# Patient Record
Sex: MALE | Race: WHITE | NOT HISPANIC OR LATINO | Employment: FULL TIME | URBAN - METROPOLITAN AREA
[De-identification: names, ages, dates, MRNs, and addresses within clinical notes are randomized per-mention and may not be internally consistent; named-entity substitution may affect disease eponyms.]

---

## 2023-05-11 ENCOUNTER — OFFICE VISIT (OUTPATIENT)
Dept: PRIMARY CARE | Facility: CLINIC | Age: 33
End: 2023-05-11
Payer: MEDICAID

## 2023-05-11 ENCOUNTER — LAB (OUTPATIENT)
Dept: LAB | Facility: LAB | Age: 33
End: 2023-05-11
Payer: MEDICAID

## 2023-05-11 VITALS
BODY MASS INDEX: 37.22 KG/M2 | HEIGHT: 70 IN | DIASTOLIC BLOOD PRESSURE: 77 MMHG | TEMPERATURE: 98.4 F | WEIGHT: 260 LBS | SYSTOLIC BLOOD PRESSURE: 122 MMHG | RESPIRATION RATE: 16 BRPM | HEART RATE: 78 BPM

## 2023-05-11 DIAGNOSIS — F32.A DEPRESSION, UNSPECIFIED DEPRESSION TYPE: ICD-10-CM

## 2023-05-11 DIAGNOSIS — E66.09 CLASS 2 OBESITY DUE TO EXCESS CALORIES WITHOUT SERIOUS COMORBIDITY WITH BODY MASS INDEX (BMI) OF 37.0 TO 37.9 IN ADULT: Primary | ICD-10-CM

## 2023-05-11 DIAGNOSIS — E66.09 CLASS 2 OBESITY DUE TO EXCESS CALORIES WITHOUT SERIOUS COMORBIDITY WITH BODY MASS INDEX (BMI) OF 37.0 TO 37.9 IN ADULT: ICD-10-CM

## 2023-05-11 DIAGNOSIS — E55.9 VITAMIN D DEFICIENCY: ICD-10-CM

## 2023-05-11 PROBLEM — M54.50 LOW BACK PAIN: Status: ACTIVE | Noted: 2023-05-11

## 2023-05-11 PROBLEM — F95.2 TOURETTE'S: Status: ACTIVE | Noted: 2023-05-11

## 2023-05-11 PROBLEM — F41.9 ANXIETY: Status: ACTIVE | Noted: 2023-05-11

## 2023-05-11 PROBLEM — E66.812 CLASS 2 OBESITY DUE TO EXCESS CALORIES WITHOUT SERIOUS COMORBIDITY WITH BODY MASS INDEX (BMI) OF 37.0 TO 37.9 IN ADULT: Status: ACTIVE | Noted: 2023-05-11

## 2023-05-11 PROBLEM — K21.9 GERD (GASTROESOPHAGEAL REFLUX DISEASE): Status: ACTIVE | Noted: 2023-05-11

## 2023-05-11 PROBLEM — G47.00 INSOMNIA: Status: ACTIVE | Noted: 2023-05-11

## 2023-05-11 LAB
CALCIDIOL (25 OH VITAMIN D3) (NG/ML) IN SER/PLAS: 27 NG/ML
CHOLESTEROL (MG/DL) IN SER/PLAS: 185 MG/DL (ref 0–199)
CHOLESTEROL IN HDL (MG/DL) IN SER/PLAS: 22.9 MG/DL
CHOLESTEROL/HDL RATIO: 8.1
LDL: 120 MG/DL (ref 0–99)
NON HDL CHOLESTEROL: 162 MG/DL
THYROTROPIN (MIU/L) IN SER/PLAS BY DETECTION LIMIT <= 0.05 MIU/L: 2.48 MIU/L (ref 0.44–3.98)
TRIGLYCERIDE (MG/DL) IN SER/PLAS: 211 MG/DL (ref 0–149)
VLDL: 42 MG/DL (ref 0–40)

## 2023-05-11 PROCEDURE — 3008F BODY MASS INDEX DOCD: CPT | Performed by: FAMILY MEDICINE

## 2023-05-11 PROCEDURE — 1036F TOBACCO NON-USER: CPT | Performed by: FAMILY MEDICINE

## 2023-05-11 PROCEDURE — 99214 OFFICE O/P EST MOD 30 MIN: CPT | Performed by: FAMILY MEDICINE

## 2023-05-11 PROCEDURE — 82306 VITAMIN D 25 HYDROXY: CPT

## 2023-05-11 PROCEDURE — 36415 COLL VENOUS BLD VENIPUNCTURE: CPT

## 2023-05-11 PROCEDURE — 84443 ASSAY THYROID STIM HORMONE: CPT

## 2023-05-11 PROCEDURE — 80061 LIPID PANEL: CPT

## 2023-05-11 RX ORDER — CHOLECALCIFEROL (VITAMIN D3) 50 MCG
50 TABLET ORAL DAILY
COMMUNITY
Start: 2022-11-19

## 2023-05-11 RX ORDER — OMEPRAZOLE 20 MG/1
20 CAPSULE, DELAYED RELEASE ORAL DAILY
COMMUNITY
End: 2023-10-30 | Stop reason: SDUPTHER

## 2023-05-11 RX ORDER — ACETAMINOPHEN, DIPHENHYDRAMINE HCL, PHENYLEPHRINE HCL 325; 25; 5 MG/1; MG/1; MG/1
0.5 TABLET ORAL NIGHTLY
COMMUNITY

## 2023-05-11 RX ORDER — ZOLPIDEM TARTRATE 5 MG/1
5 TABLET ORAL NIGHTLY PRN
COMMUNITY
Start: 2022-12-04 | End: 2023-05-11 | Stop reason: ALTCHOICE

## 2023-05-11 RX ORDER — BUPROPION HYDROCHLORIDE 100 MG/1
100 TABLET, EXTENDED RELEASE ORAL DAILY
COMMUNITY
Start: 2023-02-19 | End: 2023-05-11 | Stop reason: ALTCHOICE

## 2023-05-11 RX ORDER — TIRZEPATIDE 2.5 MG/.5ML
2.5 INJECTION, SOLUTION SUBCUTANEOUS
Qty: 2 ML | Refills: 0 | Status: SHIPPED | OUTPATIENT
Start: 2023-05-11 | End: 2023-06-13

## 2023-05-11 RX ORDER — VENLAFAXINE 75 MG/1
75 TABLET ORAL DAILY
COMMUNITY
End: 2023-07-11 | Stop reason: SDUPTHER

## 2023-05-11 RX ORDER — HYDROXYZINE HYDROCHLORIDE 25 MG/1
25 TABLET, FILM COATED ORAL EVERY 6 HOURS PRN
COMMUNITY
End: 2024-02-06 | Stop reason: SDUPTHER

## 2023-05-11 ASSESSMENT — ENCOUNTER SYMPTOMS
WEIGHT GAIN: 1
DEPRESSED MOOD: 0
DEPRESSION: 1
NERVOUS/ANXIOUS: 0
SLEEP QUALITY: GOOD
INSOMNIA: 0

## 2023-05-11 ASSESSMENT — PATIENT HEALTH QUESTIONNAIRE - PHQ9
1. LITTLE INTEREST OR PLEASURE IN DOING THINGS: NOT AT ALL
2. FEELING DOWN, DEPRESSED OR HOPELESS: NOT AT ALL
SUM OF ALL RESPONSES TO PHQ9 QUESTIONS 1 AND 2: 0

## 2023-05-11 NOTE — PROGRESS NOTES
Subjective   Darwin Ferrell is a 33 y.o. male who presents for Depression.  Depression  Visit Type: follow-up  Patient presents with the following symptoms: weight gain.  Patient is not experiencing: depressed mood, insomnia and nervousness/anxiety.    Sleep quality: good  Compliance with medications:  %      Visit Vitals  /77   Pulse 78   Temp 36.9 °C (98.4 °F)   Resp 16      Objective   Physical Exam  Constitutional:       Appearance: Normal appearance.   HENT:      Head: Normocephalic.   Eyes:      Conjunctiva/sclera: Conjunctivae normal.   Cardiovascular:      Rate and Rhythm: Normal rate and regular rhythm.   Pulmonary:      Effort: Pulmonary effort is normal.      Breath sounds: Normal breath sounds.   Musculoskeletal:      Cervical back: Neck supple.   Skin:     General: Skin is warm and dry.   Neurological:      Mental Status: He is alert.         Assessment/Plan    Problem List Items Addressed This Visit       Depression     He describes up well controlled mood with the venlafaxine which we will continue at 75 mg daily         Vitamin D deficiency     He has been taking an over-the-counter vitamin D supplement and would like to have this level checked to see if he needs to continue the supplement or not         Class 2 obesity due to excess calories without serious comorbidity with body mass index (BMI) of 37.0 to 37.9 in adult - Primary     He has struggled with obesity for many years with varying degrees of success with multiple treatments.  BMI is 37 today putting him at significant increased risk for medical problems based on his weight.  He would like to try medical intervention and I am recommending Mounjaro for him.  We will start with the lowest dose and titrate this up monthly with the help of our pharmacy consultation team.  I explained the mechanism of action of this medication controlling his satiety and the importance of sticking to a healthy diet and exercise regimen while taking  the medication         Relevant Medications    tirzepatide (Mounjaro) 2.5 mg/0.5 mL pen injector    Other Relevant Orders    Follow Up In [facility] - Pharmacy    TSH with reflex to Free T4 if abnormal    Lipid Panel    Vitamin D 25-Hydroxy,Total

## 2023-05-11 NOTE — ASSESSMENT & PLAN NOTE
He has been taking an over-the-counter vitamin D supplement and would like to have this level checked to see if he needs to continue the supplement or not

## 2023-05-11 NOTE — ASSESSMENT & PLAN NOTE
He has struggled with obesity for many years with varying degrees of success with multiple treatments.  BMI is 37 today putting him at significant increased risk for medical problems based on his weight.  He would like to try medical intervention and I am recommending Mounjaro for him.  We will start with the lowest dose and titrate this up monthly with the help of our pharmacy consultation team.  I explained the mechanism of action of this medication controlling his satiety and the importance of sticking to a healthy diet and exercise regimen while taking the medication

## 2023-05-19 ENCOUNTER — APPOINTMENT (OUTPATIENT)
Dept: PHARMACY | Facility: HOSPITAL | Age: 33
End: 2023-05-19
Payer: MEDICAID

## 2023-06-13 ENCOUNTER — TELEMEDICINE (OUTPATIENT)
Dept: PHARMACY | Facility: HOSPITAL | Age: 33
End: 2023-06-13
Payer: MEDICAID

## 2023-06-13 DIAGNOSIS — E66.09 CLASS 2 OBESITY DUE TO EXCESS CALORIES WITHOUT SERIOUS COMORBIDITY WITH BODY MASS INDEX (BMI) OF 37.0 TO 37.9 IN ADULT: ICD-10-CM

## 2023-06-13 RX ORDER — DULAGLUTIDE 0.75 MG/.5ML
0.75 INJECTION, SOLUTION SUBCUTANEOUS
Qty: 2 ML | Refills: 0 | Status: SHIPPED | OUTPATIENT
Start: 2023-06-13 | End: 2023-07-11 | Stop reason: ALTCHOICE

## 2023-06-13 NOTE — PROGRESS NOTES
"Subjective   Patient ID: Darwin Ferrell is a 33 y.o. male who presents for Weight loss    Referring Provider: Morris Marin MD     HPI    Review of Systems    Objective     There were no vitals taken for this visit.     Labs  Lab Results   Component Value Date    BILITOT 0.5 10/26/2022    CALCIUM 9.1 11/13/2022    CO2 30 11/13/2022     11/13/2022    CREATININE 0.80 11/13/2022    GLUCOSE 91 11/13/2022    ALKPHOS 51 10/26/2022    K 3.9 11/13/2022    PROT 6.9 10/26/2022     11/13/2022    AST 22 10/26/2022    ALT 47 10/26/2022    BUN 10 11/13/2022    ANIONGAP 10 11/13/2022    ALBUMIN 4.2 10/26/2022    GFRMALE >90 11/13/2022     Lab Results   Component Value Date    TRIG 211 (H) 05/11/2023    CHOL 185 05/11/2023    HDL 22.9 (A) 05/11/2023     No results found for: \"HGBA1C\"    Assessment/Plan       Mounjaro is not covered under plan for weight loss, but trulicity is covered. Will start pt on Trulicity. Discussed new medication and answered all patient questions.    Plan:  Start Trulicity 0.75mg once a week  Follow up in 4 weeks  Sent to [facility] pharmacy    Heena Rey, PharmD     Continue all meds under the continuation of care with the referring provider and clinical pharmacy team.   "

## 2023-06-13 NOTE — PATIENT INSTRUCTIONS
Hello  Please start Trulicity 0.75mg once a week. Follow up in 4 weeks.  Thank you  Heena Rey, PharmD

## 2023-06-14 NOTE — PROGRESS NOTES
I reviewed the progress note and agree with the resident’s findings and plans as written. Case discussed with resident.    Petey Bagley, PharmD

## 2023-07-11 ENCOUNTER — TELEMEDICINE (OUTPATIENT)
Dept: PHARMACY | Facility: HOSPITAL | Age: 33
End: 2023-07-11
Payer: MEDICAID

## 2023-07-11 DIAGNOSIS — F32.A DEPRESSION, UNSPECIFIED DEPRESSION TYPE: ICD-10-CM

## 2023-07-11 DIAGNOSIS — E66.09 CLASS 2 OBESITY DUE TO EXCESS CALORIES WITHOUT SERIOUS COMORBIDITY WITH BODY MASS INDEX (BMI) OF 37.0 TO 37.9 IN ADULT: ICD-10-CM

## 2023-07-11 RX ORDER — VENLAFAXINE 75 MG/1
75 TABLET ORAL DAILY
Qty: 90 TABLET | Refills: 3 | Status: SHIPPED | OUTPATIENT
Start: 2023-07-11 | End: 2024-06-03 | Stop reason: SDUPTHER

## 2023-07-11 RX ORDER — DULAGLUTIDE 1.5 MG/.5ML
1.5 INJECTION, SOLUTION SUBCUTANEOUS
Qty: 2 ML | Refills: 2 | Status: SHIPPED | OUTPATIENT
Start: 2023-07-11 | End: 2023-10-30

## 2023-07-11 NOTE — PROGRESS NOTES
"Pharmacist Clinic: Weight Management    Darwin Ferrell was referred to the Clinical Pharmacy Team for weight management.     Referring Provider: Morris Marin MD  Problem List Items Addressed This Visit          Endocrine/Metabolic    Class 2 obesity due to excess calories without serious comorbidity with body mass index (BMI) of 37.0 to 37.9 in adult       HISTORY OF PRESENT ILLNESS    Diet: no specific diet or dietary precautions taken    Exercise Routine: goes to gym once, active job    Additional Contributory Factors: Caloric intake, combined dyslipidemia    PHARMACY ASSESSMENT    Allergies: Patient has no known allergies.     [id number] - [place], [place] - [address]   [address]   [facility] [address]  Phone: [phone] Fax: [phone]    [facility] - [place], [place] - [address]  [address]  [facility] [address]  Phone: [phone] Fax: [phone]      No issues reported in regards to accessibility, affordability, adherence, adverse effects, or organization    CURRENT PHARMACOTHERAPY  - Trulicity 0.75 mg once weekly     DRUG INTERACTIONS  - No significant drug-drug interactions exist that require adjustment to therapy    LAB  Lab Results   Component Value Date    BILITOT 0.5 10/26/2022    CALCIUM 9.1 11/13/2022    CO2 30 11/13/2022     11/13/2022    CREATININE 0.80 11/13/2022    GLUCOSE 91 11/13/2022    ALKPHOS 51 10/26/2022    K 3.9 11/13/2022    PROT 6.9 10/26/2022     11/13/2022    AST 22 10/26/2022    ALT 47 10/26/2022    BUN 10 11/13/2022    ANIONGAP 10 11/13/2022    ALBUMIN 4.2 10/26/2022     Lab Results   Component Value Date    TRIG 211 (H) 05/11/2023    CHOL 185 05/11/2023    HDL 22.9 (A) 05/11/2023     No results found for: \"HGBA1C\"    Current Outpatient Medications on File Prior to Visit   Medication Sig Dispense Refill    cholecalciferol (Vitamin D-3) 50 MCG (2000 UT) tablet Take 1 tablet (50 mcg) by mouth once daily. "      dulaglutide (Trulicity) 0.75 mg/0.5 mL pen injector Inject 0.75 mg under the skin 1 (one) time per week for 28 days. Wearn please mail. Contact Heena with questions 2 mL 0    hydrOXYzine HCL (Atarax) 25 mg tablet Take 1 tablet (25 mg) by mouth every 6 hours if needed for anxiety.      melatonin 10 mg tablet Take 0.5 tablets by mouth once daily at bedtime.      omeprazole (PriLOSEC) 20 mg DR capsule Take 1 capsule (20 mg) by mouth once daily.      venlafaxine (Effexor) 75 mg tablet Take 1 tablet (75 mg) by mouth once daily.       No current facility-administered medications on file prior to visit.       PATIENT EDUCATION/GOALS  - Discussed avoiding greasy foods while on Trulicity for better tolerance  - Discussed weekly exercise recommendations and the importance of diet and lifestyle changes in combination with pharmacotherapy    - Counseled patient on MOA, expectations, side effects, duration of therapy, contraindications, administration, and monitoring parameters  - Answered all patient questions and concerns; provided [facility] phone number if issues/questions arise    RECOMMENDATIONS/PLAN    -10 lbs of weight loss since starting GLP-1 therapy  -feels really bloated some days but overall tolerable. Educated to avoid greasy foods  -discussed need for higher dose for greater weight loss benefit. Patient agreeable to plan.    PLAN:  1. Weight Loss Plan: Increase Trulicity 1.5 mg subcutaneously once weekly    Clinical Pharmacist follow up: 4 weeks  Type of Encounter: telehealth    Continue all meds under the continuation of care with the referring provider and clinical pharmacy team.    Thank you,  Roverto Shaw, PharmD     Verbal consent to manage patient's drug therapy was obtained from patient. They were informed they may decline to participate or withdraw from participation in pharmacy services at any time.

## 2023-07-17 ENCOUNTER — OFFICE VISIT (OUTPATIENT)
Dept: PRIMARY CARE | Facility: CLINIC | Age: 33
End: 2023-07-17
Payer: MEDICAID

## 2023-07-17 VITALS
RESPIRATION RATE: 16 BRPM | BODY MASS INDEX: 35.65 KG/M2 | TEMPERATURE: 98.4 F | DIASTOLIC BLOOD PRESSURE: 76 MMHG | HEART RATE: 70 BPM | WEIGHT: 249 LBS | HEIGHT: 70 IN | SYSTOLIC BLOOD PRESSURE: 112 MMHG

## 2023-07-17 DIAGNOSIS — E66.09 CLASS 2 OBESITY DUE TO EXCESS CALORIES WITHOUT SERIOUS COMORBIDITY WITH BODY MASS INDEX (BMI) OF 37.0 TO 37.9 IN ADULT: Primary | ICD-10-CM

## 2023-07-17 DIAGNOSIS — F41.9 ANXIETY: ICD-10-CM

## 2023-07-17 DIAGNOSIS — F32.A DEPRESSION, UNSPECIFIED DEPRESSION TYPE: ICD-10-CM

## 2023-07-17 PROCEDURE — 1036F TOBACCO NON-USER: CPT | Performed by: FAMILY MEDICINE

## 2023-07-17 PROCEDURE — 3008F BODY MASS INDEX DOCD: CPT | Performed by: FAMILY MEDICINE

## 2023-07-17 PROCEDURE — 99214 OFFICE O/P EST MOD 30 MIN: CPT | Performed by: FAMILY MEDICINE

## 2023-07-17 RX ORDER — BUSPIRONE HYDROCHLORIDE 10 MG/1
10 TABLET ORAL 2 TIMES DAILY
Qty: 60 TABLET | Refills: 2 | Status: SHIPPED | OUTPATIENT
Start: 2023-07-17 | End: 2023-09-27

## 2023-07-17 ASSESSMENT — ANXIETY QUESTIONNAIRES
5. BEING SO RESTLESS THAT IT IS HARD TO SIT STILL: MORE THAN HALF THE DAYS
2. NOT BEING ABLE TO STOP OR CONTROL WORRYING: NEARLY EVERY DAY
6. BECOMING EASILY ANNOYED OR IRRITABLE: NEARLY EVERY DAY
4. TROUBLE RELAXING: NEARLY EVERY DAY
7. FEELING AFRAID AS IF SOMETHING AWFUL MIGHT HAPPEN: NEARLY EVERY DAY
GAD7 TOTAL SCORE: 17
3. WORRYING TOO MUCH ABOUT DIFFERENT THINGS: NOT AT ALL
1. FEELING NERVOUS, ANXIOUS, OR ON EDGE: NEARLY EVERY DAY
IF YOU CHECKED OFF ANY PROBLEMS ON THIS QUESTIONNAIRE, HOW DIFFICULT HAVE THESE PROBLEMS MADE IT FOR YOU TO DO YOUR WORK, TAKE CARE OF THINGS AT HOME, OR GET ALONG WITH OTHER PEOPLE: VERY DIFFICULT

## 2023-07-17 NOTE — ASSESSMENT & PLAN NOTE
He has lost 11 pounds total since starting the Trulicity.  I encouraged him to continue exercise, diet control, and with the titration of Trulicity.  We will follow-up in 3 months

## 2023-07-17 NOTE — PROGRESS NOTES
Subjective   Darwin Ferrell is a 33 y.o. male who presents for Anxiety.  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  However, he is notes that he is anxious all the time.  He is worrying excessively on a regular basis.  He is having panic attacks where he feels like he cannot breathe on an almost daily basis.    He is doing very well with the Trulicity and has been titrating the dose up.  He had a little bit of nausea and bloating which resolved after a few days.  He describes some odd muscle twitching in his right upper abdomen and asks if this might be due to the Trulicity but he says that he is giving the Trulicity shots on the left side of his abdomen      Visit Vitals  /76   Pulse 70   Temp 36.9 °C (98.4 °F)   Resp 16      Objective   Physical Exam  Constitutional:       Appearance: Normal appearance.   HENT:      Head: Normocephalic.   Pulmonary:      Effort: Pulmonary effort is normal.   Musculoskeletal:      Cervical back: Neck supple.      Right lower leg: No edema.      Left lower leg: No edema.   Skin:     General: Skin is warm and dry.   Psychiatric:         Mood and Affect: Mood normal.         Assessment/Plan    Problem List Items Addressed This Visit       Anxiety     This 33-year-old male is having fairly regular panic attacks of dyspnea without any specific trigger.  He is describing a constant level of anxiety and excessive worry.  The symptoms are improved after a dose of hydroxyzine but always come back and sometimes require multiple doses of hydroxyzine.  I think that it is time to start a more long-term prophylactic antianxiety medication.  I would like to initiate buspirone 10 mg twice daily.  We discussed the fact that this is not habit-forming nor sedating but should significantly decrease the anxiety symptoms and panic attacks over time.  We will continue the venlafaxine and he may use hydroxyzine intermittently on top of  this treatment         Relevant Medications    busPIRone (Buspar) 10 mg tablet    Depression    Class 2 obesity due to excess calories without serious comorbidity with body mass index (BMI) of 37.0 to 37.9 in adult - Primary     He has lost 11 pounds total since starting the Trulicity.  I encouraged him to continue exercise, diet control, and with the titration of Trulicity.  We will follow-up in 3 months         Relevant Orders    Follow Up In [facility] - PCP - Established

## 2023-07-17 NOTE — ASSESSMENT & PLAN NOTE
This 33-year-old male is having fairly regular panic attacks of dyspnea without any specific trigger.  He is describing a constant level of anxiety and excessive worry.  The symptoms are improved after a dose of hydroxyzine but always come back and sometimes require multiple doses of hydroxyzine.  I think that it is time to start a more long-term prophylactic antianxiety medication.  I would like to initiate buspirone 10 mg twice daily.  We discussed the fact that this is not habit-forming nor sedating but should significantly decrease the anxiety symptoms and panic attacks over time.  We will continue the venlafaxine and he may use hydroxyzine intermittently on top of this treatment

## 2023-07-28 ENCOUNTER — TELEPHONE (OUTPATIENT)
Dept: PRIMARY CARE | Facility: CLINIC | Age: 33
End: 2023-07-28
Payer: MEDICAID

## 2023-08-08 ENCOUNTER — TELEMEDICINE (OUTPATIENT)
Dept: PHARMACY | Facility: HOSPITAL | Age: 33
End: 2023-08-08
Payer: MEDICAID

## 2023-08-08 DIAGNOSIS — E66.09 CLASS 2 OBESITY DUE TO EXCESS CALORIES WITHOUT SERIOUS COMORBIDITY WITH BODY MASS INDEX (BMI) OF 37.0 TO 37.9 IN ADULT: ICD-10-CM

## 2023-08-08 NOTE — PROGRESS NOTES
"Subjective   Patient ID: Darwin Ferrell is a 33 y.o. male who presents for Obesity (Follow-up on Trulicity use). Stopped Trulicity about a week ago per pcp and patient due to G.I. side effects. Sx have improved since stopping and would like to wait until next PCP visit to visit other options.     There were no vitals taken for this visit.        Assessment/Plan   Problem List Items Addressed This Visit       Class 2 obesity due to excess calories without serious comorbidity with body mass index (BMI) of 37.0 to 37.9 in adult       LAB RESULTS:  No results found for: \"HGBA1C\"  Lab Results   Component Value Date    CREATININE 0.80 11/13/2022      Lab Results   Component Value Date    CHOL 185 05/11/2023     Lab Results   Component Value Date    HDL 22.9 (A) 05/11/2023       No results found for: \"LDLCALC\"  Lab Results   Component Value Date    TRIG 211 (H) 05/11/2023       Continue all meds under the continuation of care with the referring provider and clinical pharmacy team.  -Follow-up as needed; therapy options to be discussed at next PCP appt.        "

## 2023-09-08 ENCOUNTER — APPOINTMENT (OUTPATIENT)
Dept: PRIMARY CARE | Facility: CLINIC | Age: 33
End: 2023-09-08
Payer: MEDICAID

## 2023-09-23 DIAGNOSIS — E55.9 VITAMIN D DEFICIENCY: Primary | ICD-10-CM

## 2023-09-25 RX ORDER — NICOTINE 11MG/24HR
50 PATCH, TRANSDERMAL 24 HOURS TRANSDERMAL DAILY
Qty: 90 CAPSULE | Refills: 0 | Status: SHIPPED | OUTPATIENT
Start: 2023-09-25 | End: 2023-10-30 | Stop reason: ALTCHOICE

## 2023-09-27 DIAGNOSIS — F41.9 ANXIETY: ICD-10-CM

## 2023-09-27 RX ORDER — BUSPIRONE HYDROCHLORIDE 10 MG/1
10 TABLET ORAL 2 TIMES DAILY
Qty: 60 TABLET | Refills: 3 | Status: SHIPPED | OUTPATIENT
Start: 2023-09-27 | End: 2023-10-30 | Stop reason: SDUPTHER

## 2023-10-18 ENCOUNTER — APPOINTMENT (OUTPATIENT)
Dept: PRIMARY CARE | Facility: CLINIC | Age: 33
End: 2023-10-18
Payer: COMMERCIAL

## 2023-10-30 ENCOUNTER — OFFICE VISIT (OUTPATIENT)
Dept: PRIMARY CARE | Facility: CLINIC | Age: 33
End: 2023-10-30
Payer: COMMERCIAL

## 2023-10-30 VITALS
RESPIRATION RATE: 16 BRPM | SYSTOLIC BLOOD PRESSURE: 115 MMHG | TEMPERATURE: 98.3 F | BODY MASS INDEX: 35.5 KG/M2 | WEIGHT: 248 LBS | DIASTOLIC BLOOD PRESSURE: 78 MMHG | HEIGHT: 70 IN | HEART RATE: 76 BPM

## 2023-10-30 DIAGNOSIS — F41.9 ANXIETY: ICD-10-CM

## 2023-10-30 DIAGNOSIS — E66.09 CLASS 2 OBESITY DUE TO EXCESS CALORIES WITHOUT SERIOUS COMORBIDITY WITH BODY MASS INDEX (BMI) OF 37.0 TO 37.9 IN ADULT: ICD-10-CM

## 2023-10-30 DIAGNOSIS — K21.9 GASTROESOPHAGEAL REFLUX DISEASE, UNSPECIFIED WHETHER ESOPHAGITIS PRESENT: ICD-10-CM

## 2023-10-30 PROCEDURE — 1036F TOBACCO NON-USER: CPT | Performed by: FAMILY MEDICINE

## 2023-10-30 PROCEDURE — 99213 OFFICE O/P EST LOW 20 MIN: CPT | Performed by: FAMILY MEDICINE

## 2023-10-30 PROCEDURE — 3008F BODY MASS INDEX DOCD: CPT | Performed by: FAMILY MEDICINE

## 2023-10-30 RX ORDER — OMEPRAZOLE 20 MG/1
20 CAPSULE, DELAYED RELEASE ORAL DAILY
Qty: 90 CAPSULE | Refills: 3 | Status: SHIPPED | OUTPATIENT
Start: 2023-10-30

## 2023-10-30 RX ORDER — BUSPIRONE HYDROCHLORIDE 15 MG/1
15 TABLET ORAL 2 TIMES DAILY
Qty: 60 TABLET | Refills: 11 | Status: SHIPPED | OUTPATIENT
Start: 2023-10-30 | End: 2024-02-19 | Stop reason: SDUPTHER

## 2023-10-30 ASSESSMENT — ENCOUNTER SYMPTOMS: NERVOUS/ANXIOUS: 1

## 2023-10-30 NOTE — ASSESSMENT & PLAN NOTE
We started BuSpar 10 mg at the last visit there was a noticeable decrease in anxiety symptoms but they have been slowly creeping back over the last month.  He is still having significant panic attacks intermittently.  These are reasonably controlled with hydroxyzine but I would say that this is definitely not under control so we will increase the dose to 50 mg twice daily.  If this is not sufficient we can increase the dose 1 more time before his next visit.

## 2023-10-30 NOTE — PROGRESS NOTES
Subjective   Darwin Ferrell is a 33 y.o. male who presents for Anxiety.  Anxiety  Presents for follow-up visit. Symptoms include excessive worry and nervous/anxious behavior. Symptoms occur most days. The quality of sleep is good.     Compliance with medications is %.     Visit Vitals  /78   Pulse 76   Temp 36.8 °C (98.3 °F)   Resp 16      Objective   Physical Exam  Constitutional:       Appearance: Normal appearance.   HENT:      Head: Normocephalic.      Right Ear: Tympanic membrane, ear canal and external ear normal.      Left Ear: Tympanic membrane, ear canal and external ear normal.      Nose: Nose normal.      Mouth/Throat:      Mouth: Mucous membranes are moist.   Eyes:      Conjunctiva/sclera: Conjunctivae normal.   Cardiovascular:      Rate and Rhythm: Normal rate and regular rhythm.   Pulmonary:      Effort: Pulmonary effort is normal.      Breath sounds: Normal breath sounds.   Skin:     General: Skin is warm.      Findings: No rash.   Neurological:      Mental Status: He is alert.   Psychiatric:         Mood and Affect: Mood normal.         Assessment/Plan    Problem List Items Addressed This Visit       Anxiety     We started BuSpar 10 mg at the last visit there was a noticeable decrease in anxiety symptoms but they have been slowly creeping back over the last month.  He is still having significant panic attacks intermittently.  These are reasonably controlled with hydroxyzine but I would say that this is definitely not under control so we will increase the dose to 50 mg twice daily.  If this is not sufficient we can increase the dose 1 more time before his next visit.         Relevant Medications    busPIRone (Buspar) 15 mg tablet    Other Relevant Orders    Follow Up In [facility] - PCP - Established    GERD (gastroesophageal reflux disease)     This is well controlled we will refill the omeprazole         Relevant Medications    omeprazole (PriLOSEC) 20 mg DR capsule    Class 2  obesity due to excess calories without serious comorbidity with body mass index (BMI) of 37.0 to 37.9 in adult

## 2024-01-22 ENCOUNTER — OFFICE VISIT (OUTPATIENT)
Dept: PRIMARY CARE | Facility: CLINIC | Age: 34
End: 2024-01-22
Payer: COMMERCIAL

## 2024-01-22 ENCOUNTER — HOSPITAL ENCOUNTER (OUTPATIENT)
Dept: RADIOLOGY | Facility: CLINIC | Age: 34
Discharge: HOME | End: 2024-01-22
Payer: COMMERCIAL

## 2024-01-22 VITALS
HEART RATE: 84 BPM | SYSTOLIC BLOOD PRESSURE: 127 MMHG | BODY MASS INDEX: 36.08 KG/M2 | DIASTOLIC BLOOD PRESSURE: 85 MMHG | HEIGHT: 70 IN | TEMPERATURE: 97.5 F | WEIGHT: 252 LBS | RESPIRATION RATE: 16 BRPM

## 2024-01-22 DIAGNOSIS — K21.9 GASTROESOPHAGEAL REFLUX DISEASE WITHOUT ESOPHAGITIS: ICD-10-CM

## 2024-01-22 DIAGNOSIS — N50.811 TESTICULAR PAIN, RIGHT: ICD-10-CM

## 2024-01-22 DIAGNOSIS — N50.811 TESTICULAR PAIN, RIGHT: Primary | ICD-10-CM

## 2024-01-22 DIAGNOSIS — F41.9 ANXIETY: ICD-10-CM

## 2024-01-22 PROCEDURE — 99213 OFFICE O/P EST LOW 20 MIN: CPT | Performed by: FAMILY MEDICINE

## 2024-01-22 PROCEDURE — 76870 US EXAM SCROTUM: CPT | Performed by: RADIOLOGY

## 2024-01-22 PROCEDURE — 76870 US EXAM SCROTUM: CPT

## 2024-01-22 PROCEDURE — 3008F BODY MASS INDEX DOCD: CPT | Performed by: FAMILY MEDICINE

## 2024-01-22 PROCEDURE — 1036F TOBACCO NON-USER: CPT | Performed by: FAMILY MEDICINE

## 2024-01-22 ASSESSMENT — ENCOUNTER SYMPTOMS
NERVOUS/ANXIOUS: 1
PANIC: 1

## 2024-01-22 NOTE — PROGRESS NOTES
Subjective   Darwin Ferrell is a 33 y.o. male who presents for Testicle Pain, Anxiety, and Oral Pain.  Testicle Pain  The patient's primary symptoms include testicular pain. Episode onset: about 2 weeks ago. The problem has been gradually improving. The testicular pain affects the right testicle.   Anxiety  Presents for follow-up visit. Symptoms include nervous/anxious behavior and panic. Symptoms occur most days. The quality of sleep is good.     Compliance with medications is %.   Oral Pain   This is a new problem. Episode onset: about 2 weeks ago. The problem occurs constantly. The problem has been unchanged.     Visit Vitals  /85   Pulse 84   Temp 36.4 °C (97.5 °F)   Resp 16      Objective   Physical Exam  Constitutional:       Appearance: Normal appearance.   HENT:      Head: Normocephalic.      Mouth/Throat:      Mouth: Mucous membranes are moist.      Comments: There is some mildly enlarged papilla on the tongue but no ulcers, thrush, erythema, or edema.  The soft palate is clear and pink  Eyes:      Conjunctiva/sclera: Conjunctivae normal.   Cardiovascular:      Rate and Rhythm: Normal rate and regular rhythm.   Pulmonary:      Effort: Pulmonary effort is normal.      Breath sounds: Normal breath sounds.   Genitourinary:     Comments: The inguinal canal shows no enlargement or bulge on Valsalva and no adenopathy.  The phallus is normal.  Testicles are equal in size but the right testicle is significantly tender and difficult to fully examine.  I cannot appreciate any epididymis enlargement or testicular mass today.  Musculoskeletal:      Cervical back: Neck supple.   Skin:     General: Skin is warm and dry.   Neurological:      Mental Status: He is alert.         Assessment/Plan    Problem List Items Addressed This Visit       Anxiety     This is significantly better with the increased dose of BuSpar 15 mg twice daily and the current dose of Effexor.  He had one anxiety attack that seems to be  triggered by testicular pain but otherwise is not having increased anxiety on a daily basis and is having less than 1 panic attack per month.  We will continue the current dose         Relevant Orders    Follow Up In [facility] - PCP - Established    GERD (gastroesophageal reflux disease)     He is currently describing some burning on his tongue.  Exam reveals a normal-looking tongue with some mildly enlarged papilla but no sign of thrush or infection or significant glossitis.  This may be due to acid irritation on the tongue versus a vitamin deficiency.  I recommended a multivitamin and if that is not helpful doubling up on the omeprazole for 2 weeks to reduce acid on the tongue.          Other Visit Diagnoses       Testicular pain, right    -  Primary    Relevant Orders    US scrotum        The right testicular pain is significant but the exam is relatively benign except for pain.  There is no inguinal adenopathy or evidence of an inguinal hernia.  I think the differential diagnosis includes epididymitis or varicocele or potentially intermittent testicular torsion.  We will get an ultrasound to help differentiate and possibly refer to urology

## 2024-01-22 NOTE — ASSESSMENT & PLAN NOTE
This is significantly better with the increased dose of BuSpar 15 mg twice daily and the current dose of Effexor.  He had one anxiety attack that seems to be triggered by testicular pain but otherwise is not having increased anxiety on a daily basis and is having less than 1 panic attack per month.  We will continue the current dose

## 2024-01-22 NOTE — ASSESSMENT & PLAN NOTE
He is currently describing some burning on his tongue.  Exam reveals a normal-looking tongue with some mildly enlarged papilla but no sign of thrush or infection or significant glossitis.  This may be due to acid irritation on the tongue versus a vitamin deficiency.  I recommended a multivitamin and if that is not helpful doubling up on the omeprazole for 2 weeks to reduce acid on the tongue.

## 2024-02-06 DIAGNOSIS — F41.9 ANXIETY: ICD-10-CM

## 2024-02-06 RX ORDER — HYDROXYZINE HYDROCHLORIDE 25 MG/1
25 TABLET, FILM COATED ORAL EVERY 6 HOURS PRN
Qty: 90 TABLET | Refills: 0 | Status: SHIPPED | OUTPATIENT
Start: 2024-02-06 | End: 2024-04-01 | Stop reason: SDUPTHER

## 2024-02-28 ENCOUNTER — OFFICE VISIT (OUTPATIENT)
Dept: PRIMARY CARE | Facility: CLINIC | Age: 34
End: 2024-02-28
Payer: COMMERCIAL

## 2024-02-28 VITALS
RESPIRATION RATE: 18 BRPM | BODY MASS INDEX: 36.94 KG/M2 | DIASTOLIC BLOOD PRESSURE: 68 MMHG | WEIGHT: 258 LBS | HEART RATE: 90 BPM | HEIGHT: 70 IN | TEMPERATURE: 97.9 F | SYSTOLIC BLOOD PRESSURE: 124 MMHG

## 2024-02-28 DIAGNOSIS — K21.9 GASTROESOPHAGEAL REFLUX DISEASE WITHOUT ESOPHAGITIS: ICD-10-CM

## 2024-02-28 DIAGNOSIS — F41.9 ANXIETY: Primary | ICD-10-CM

## 2024-02-28 PROCEDURE — 99213 OFFICE O/P EST LOW 20 MIN: CPT | Performed by: FAMILY MEDICINE

## 2024-02-28 PROCEDURE — 1036F TOBACCO NON-USER: CPT | Performed by: FAMILY MEDICINE

## 2024-02-28 PROCEDURE — 3008F BODY MASS INDEX DOCD: CPT | Performed by: FAMILY MEDICINE

## 2024-02-28 RX ORDER — BUSPIRONE HYDROCHLORIDE 30 MG/1
30 TABLET ORAL 2 TIMES DAILY
Qty: 60 TABLET | Refills: 3 | Status: SHIPPED | OUTPATIENT
Start: 2024-02-28

## 2024-02-28 ASSESSMENT — ANXIETY QUESTIONNAIRES
4. TROUBLE RELAXING: MORE THAN HALF THE DAYS
5. BEING SO RESTLESS THAT IT IS HARD TO SIT STILL: SEVERAL DAYS
GAD7 TOTAL SCORE: 17
2. NOT BEING ABLE TO STOP OR CONTROL WORRYING: MORE THAN HALF THE DAYS
3. WORRYING TOO MUCH ABOUT DIFFERENT THINGS: NEARLY EVERY DAY
5. BEING SO RESTLESS THAT IT IS HARD TO SIT STILL: SEVERAL DAYS
4. TROUBLE RELAXING: SEVERAL DAYS
3. WORRYING TOO MUCH ABOUT DIFFERENT THINGS: MORE THAN HALF THE DAYS
GAD7 TOTAL SCORE: 12
6. BECOMING EASILY ANNOYED OR IRRITABLE: SEVERAL DAYS
7. FEELING AFRAID AS IF SOMETHING AWFUL MIGHT HAPPEN: NEARLY EVERY DAY
1. FEELING NERVOUS, ANXIOUS, OR ON EDGE: NEARLY EVERY DAY
1. FEELING NERVOUS, ANXIOUS, OR ON EDGE: NEARLY EVERY DAY
6. BECOMING EASILY ANNOYED OR IRRITABLE: NEARLY EVERY DAY
7. FEELING AFRAID AS IF SOMETHING AWFUL MIGHT HAPPEN: MORE THAN HALF THE DAYS
2. NOT BEING ABLE TO STOP OR CONTROL WORRYING: MORE THAN HALF THE DAYS
IF YOU CHECKED OFF ANY PROBLEMS ON THIS QUESTIONNAIRE, HOW DIFFICULT HAVE THESE PROBLEMS MADE IT FOR YOU TO DO YOUR WORK, TAKE CARE OF THINGS AT HOME, OR GET ALONG WITH OTHER PEOPLE: SOMEWHAT DIFFICULT

## 2024-02-28 ASSESSMENT — ENCOUNTER SYMPTOMS
PALPITATIONS: 1
PANIC: 1
NERVOUS/ANXIOUS: 1

## 2024-02-28 NOTE — PROGRESS NOTES
Subjective   Darwin Ferrell is a 33 y.o. male who presents for Anxiety.  Anxiety  Presents for follow-up visit. Symptoms include excessive worry, nervous/anxious behavior, palpitations and panic. The severity of symptoms is causing significant distress. The quality of sleep is good.     Compliance with medications is %.            Visit Vitals  /68   Pulse 90   Temp 36.6 °C (97.9 °F)   Resp 18      Objective   Physical Exam  Constitutional:       Appearance: Normal appearance.   HENT:      Head: Normocephalic.   Pulmonary:      Effort: Pulmonary effort is normal.   Musculoskeletal:      Cervical back: Neck supple.   Skin:     General: Skin is warm and dry.   Psychiatric:         Mood and Affect: Mood normal.     PANDA-7 Total Score: 17    Assessment/Plan      Problem List Items Addressed This Visit       Anxiety - Primary     We did slowly titrating up the BuSpar and some symptoms are better but PANDA-7 score is still at 17 and he had a 6 single panic attack causing him to be working last month.  I believe we should continue the up titration to 30 mg twice daily.  We could also consider a slight increase in the Effexor as there may be some benefit to anxiety from that as well.  He is also interested in starting behavioral health counseling and learning self relaxation techniques which I think would be highly effective in controlling his anxiety.  I will put this referral through         Relevant Medications    busPIRone (Buspar) 30 mg tablet    Other Relevant Orders    Referral to Psychology    Follow Up In [facility] - PCP - Established    GERD (gastroesophageal reflux disease)     The irritation of the tongue has indeed resolved with a higher dose of omeprazole so we will resume the regular 20 mg dose daily

## 2024-02-28 NOTE — ASSESSMENT & PLAN NOTE
The irritation of the tongue has indeed resolved with a higher dose of omeprazole so we will resume the regular 20 mg dose daily

## 2024-02-28 NOTE — ASSESSMENT & PLAN NOTE
We did slowly titrating up the BuSpar and some symptoms are better but PANDA-7 score is still at 17 and he had a 6 single panic attack causing him to be working last month.  I believe we should continue the up titration to 30 mg twice daily.  We could also consider a slight increase in the Effexor as there may be some benefit to anxiety from that as well.  He is also interested in starting behavioral health counseling and learning self relaxation techniques which I think would be highly effective in controlling his anxiety.  I will put this referral through

## 2024-04-01 DIAGNOSIS — F41.9 ANXIETY: ICD-10-CM

## 2024-04-01 RX ORDER — HYDROXYZINE HYDROCHLORIDE 25 MG/1
25 TABLET, FILM COATED ORAL EVERY 6 HOURS PRN
Qty: 90 TABLET | Refills: 0 | Status: SHIPPED | OUTPATIENT
Start: 2024-04-01 | End: 2024-06-04

## 2024-06-03 DIAGNOSIS — F32.A DEPRESSION, UNSPECIFIED DEPRESSION TYPE: ICD-10-CM

## 2024-06-03 DIAGNOSIS — F41.9 ANXIETY: ICD-10-CM

## 2024-06-03 RX ORDER — VENLAFAXINE 75 MG/1
75 TABLET ORAL DAILY
Qty: 90 TABLET | Refills: 3 | Status: SHIPPED | OUTPATIENT
Start: 2024-06-03

## 2024-06-04 RX ORDER — HYDROXYZINE PAMOATE 25 MG/1
25 CAPSULE ORAL EVERY 6 HOURS PRN
Qty: 90 CAPSULE | Refills: 0 | Status: SHIPPED | OUTPATIENT
Start: 2024-06-04

## 2024-06-05 ENCOUNTER — OFFICE VISIT (OUTPATIENT)
Dept: PRIMARY CARE | Facility: CLINIC | Age: 34
End: 2024-06-05
Payer: COMMERCIAL

## 2024-06-05 VITALS
DIASTOLIC BLOOD PRESSURE: 85 MMHG | HEIGHT: 70 IN | SYSTOLIC BLOOD PRESSURE: 122 MMHG | RESPIRATION RATE: 14 BRPM | WEIGHT: 259 LBS | TEMPERATURE: 97.7 F | HEART RATE: 96 BPM | BODY MASS INDEX: 37.08 KG/M2

## 2024-06-05 DIAGNOSIS — F41.9 ANXIETY: ICD-10-CM

## 2024-06-05 DIAGNOSIS — L30.9 DERMATITIS: Primary | ICD-10-CM

## 2024-06-05 PROCEDURE — 3008F BODY MASS INDEX DOCD: CPT | Performed by: FAMILY MEDICINE

## 2024-06-05 PROCEDURE — 99213 OFFICE O/P EST LOW 20 MIN: CPT | Performed by: FAMILY MEDICINE

## 2024-06-05 PROCEDURE — 1036F TOBACCO NON-USER: CPT | Performed by: FAMILY MEDICINE

## 2024-06-05 RX ORDER — KETOCONAZOLE 20 MG/ML
SHAMPOO, SUSPENSION TOPICAL 2 TIMES WEEKLY
Qty: 120 ML | Refills: 0 | Status: SHIPPED | OUTPATIENT
Start: 2024-06-06

## 2024-06-05 ASSESSMENT — ANXIETY QUESTIONNAIRES
1. FEELING NERVOUS, ANXIOUS, OR ON EDGE: SEVERAL DAYS
IF YOU CHECKED OFF ANY PROBLEMS ON THIS QUESTIONNAIRE, HOW DIFFICULT HAVE THESE PROBLEMS MADE IT FOR YOU TO DO YOUR WORK, TAKE CARE OF THINGS AT HOME, OR GET ALONG WITH OTHER PEOPLE: NOT DIFFICULT AT ALL
3. WORRYING TOO MUCH ABOUT DIFFERENT THINGS: NOT AT ALL
6. BECOMING EASILY ANNOYED OR IRRITABLE: NOT AT ALL
GAD7 TOTAL SCORE: 1
7. FEELING AFRAID AS IF SOMETHING AWFUL MIGHT HAPPEN: NOT AT ALL
2. NOT BEING ABLE TO STOP OR CONTROL WORRYING: NOT AT ALL
5. BEING SO RESTLESS THAT IT IS HARD TO SIT STILL: NOT AT ALL
4. TROUBLE RELAXING: NOT AT ALL

## 2024-06-05 ASSESSMENT — ENCOUNTER SYMPTOMS: NERVOUS/ANXIOUS: 1

## 2024-06-05 NOTE — PROGRESS NOTES
Subjective   Darwin Ferrell is a 34 y.o. male who presents for Anxiety.  Anxiety  Presents for follow-up visit. Symptoms include excessive worry and nervous/anxious behavior. The severity of symptoms is mild.     Compliance with medications is %.            Visit Vitals  /85   Pulse 96   Temp 36.5 °C (97.7 °F)   Resp 14    PANDA-7 Total Score: 1    Objective   Physical Exam  Constitutional:       Appearance: Normal appearance.   HENT:      Head: Normocephalic.   Eyes:      Conjunctiva/sclera: Conjunctivae normal.   Cardiovascular:      Rate and Rhythm: Normal rate and regular rhythm.      Heart sounds: Normal heart sounds.   Pulmonary:      Effort: Pulmonary effort is normal.      Breath sounds: Normal breath sounds.   Musculoskeletal:      Cervical back: Neck supple.   Skin:     General: Skin is warm and dry.   Neurological:      Mental Status: He is alert.       Assessment/Plan      Problem List Items Addressed This Visit       Anxiety     Things have improved dramatically since uptitrating BuSpar and continuing the Effexor 75 mg.  He now remembers feelings of anxiety only rarely and occasionally and his PANDA-7 score is only 1.  His last panic attack was before the titration started.  I recommend we continue the BuSpar 30 mg, Effexor 75 mg, and as he has not yet seen the counselor we will reprint the referral to psychology counseling for him to set this up.         Relevant Orders    Follow Up In [facility] - PCP - Established     Other Visit Diagnoses       Dermatitis    -  Primary    Relevant Medications    ketoconazole (NIZOral) 2 % shampoo (Start on 6/6/2024)               Please excuse any errors in grammar or translation related to this dictation. Voice recognition software was utilized to prepare this document.

## 2024-06-05 NOTE — ASSESSMENT & PLAN NOTE
Things have improved dramatically since uptitrating BuSpar and continuing the Effexor 75 mg.  He now remembers feelings of anxiety only rarely and occasionally and his PANDA-7 score is only 1.  His last panic attack was before the titration started.  I recommend we continue the BuSpar 30 mg, Effexor 75 mg, and as he has not yet seen the counselor we will reprint the referral to psychology counseling for him to set this up.

## 2024-06-19 ENCOUNTER — OFFICE VISIT (OUTPATIENT)
Dept: PRIMARY CARE | Facility: CLINIC | Age: 34
End: 2024-06-19
Payer: COMMERCIAL

## 2024-06-19 VITALS
DIASTOLIC BLOOD PRESSURE: 84 MMHG | WEIGHT: 267 LBS | BODY MASS INDEX: 38.31 KG/M2 | OXYGEN SATURATION: 98 % | SYSTOLIC BLOOD PRESSURE: 132 MMHG | HEART RATE: 94 BPM | TEMPERATURE: 98.5 F

## 2024-06-19 DIAGNOSIS — L25.9 CONTACT DERMATITIS, UNSPECIFIED CONTACT DERMATITIS TYPE, UNSPECIFIED TRIGGER: Primary | ICD-10-CM

## 2024-06-19 PROCEDURE — 99213 OFFICE O/P EST LOW 20 MIN: CPT | Performed by: NURSE PRACTITIONER

## 2024-06-19 PROCEDURE — 3008F BODY MASS INDEX DOCD: CPT | Performed by: NURSE PRACTITIONER

## 2024-06-19 PROCEDURE — 1036F TOBACCO NON-USER: CPT | Performed by: NURSE PRACTITIONER

## 2024-06-19 RX ORDER — METHYLPREDNISOLONE 4 MG/1
TABLET ORAL
Qty: 21 TABLET | Refills: 0 | Status: SHIPPED | OUTPATIENT
Start: 2024-06-19 | End: 2024-06-26

## 2024-06-19 ASSESSMENT — ENCOUNTER SYMPTOMS
GASTROINTESTINAL NEGATIVE: 1
CARDIOVASCULAR NEGATIVE: 1
RESPIRATORY NEGATIVE: 1
CHILLS: 0
APPETITE CHANGE: 0
FEVER: 0
FATIGUE: 0
MUSCULOSKELETAL NEGATIVE: 1

## 2024-06-19 NOTE — PROGRESS NOTES
Patient has had hive like patches for the past two weeks. No new soaps, lotions or detergents. Patient has been using hydrocortisone and taking claritin. The patches of skin are itchy. The claritin is helping slightly. Patient is feeling well otherwise. No URI symptoms.     Review of Systems   Constitutional:  Negative for appetite change, chills, fatigue and fever.   HENT: Negative.     Respiratory: Negative.     Cardiovascular: Negative.    Gastrointestinal: Negative.    Musculoskeletal: Negative.    Skin:  Positive for rash.       Objective   /84   Pulse 94   Temp 36.9 °C (98.5 °F)   Wt 121 kg (267 lb)   SpO2 98%   BMI 38.31 kg/m²     Physical Exam  Vitals reviewed.   Constitutional:       General: He is not in acute distress.     Appearance: Normal appearance. He is not ill-appearing.   HENT:      Head: Atraumatic.   Eyes:      Conjunctiva/sclera: Conjunctivae normal.   Cardiovascular:      Rate and Rhythm: Normal rate and regular rhythm.      Heart sounds: Normal heart sounds. No murmur heard.  Pulmonary:      Effort: Pulmonary effort is normal.      Breath sounds: Normal breath sounds. No wheezing or rhonchi.   Skin:     General: Skin is warm.      Comments: Patient with red patches of dry skin on the abdomen, the legs and lower back consistent with contact dermatitis. No s/s of infection    Neurological:      General: No focal deficit present.      Mental Status: He is alert.     Assessment/Plan   Problem List Items Addressed This Visit    None  Visit Diagnoses         Codes    Contact dermatitis, unspecified contact dermatitis type, unspecified trigger    -  Primary L25.9    Relevant Medications    methylPREDNISolone (Medrol Dospak) 4 mg tablets        Patient started on a medrol princess at this time. Reminded pt to avoid other anti-inflammatories while on the steroids; he agreed. Pt to continue claritin. Reminded pt to take cool showers and to keep his skin clean and dry. Advised ER for any worsening  rash or new/concerning symptoms; he agreed. Pt to follow up if symptoms persist despite the use of the medications.

## 2024-06-26 DIAGNOSIS — F41.9 ANXIETY: ICD-10-CM

## 2024-06-26 RX ORDER — HYDROXYZINE PAMOATE 25 MG/1
25 CAPSULE ORAL EVERY 6 HOURS PRN
Qty: 90 CAPSULE | Refills: 0 | Status: SHIPPED | OUTPATIENT
Start: 2024-06-26

## 2024-07-02 DIAGNOSIS — F41.9 ANXIETY: ICD-10-CM

## 2024-07-02 RX ORDER — BUSPIRONE HYDROCHLORIDE 30 MG/1
30 TABLET ORAL 2 TIMES DAILY
Qty: 180 TABLET | Refills: 3 | Status: SHIPPED | OUTPATIENT
Start: 2024-07-02

## 2024-07-19 ENCOUNTER — APPOINTMENT (OUTPATIENT)
Dept: PRIMARY CARE | Facility: CLINIC | Age: 34
End: 2024-07-19
Payer: COMMERCIAL

## 2024-07-23 ENCOUNTER — HOSPITAL ENCOUNTER (OUTPATIENT)
Dept: RADIOLOGY | Facility: EXTERNAL LOCATION | Age: 34
Discharge: HOME | End: 2024-07-23

## 2024-07-23 DIAGNOSIS — M79.672 PAIN IN LEFT FOOT: ICD-10-CM

## 2024-07-23 DIAGNOSIS — M79.671 PAIN IN RIGHT FOOT: ICD-10-CM

## 2024-07-23 DIAGNOSIS — M79.671 RIGHT FOOT PAIN: ICD-10-CM

## 2024-07-23 DIAGNOSIS — M79.672 LEFT FOOT PAIN: ICD-10-CM

## 2024-09-30 DIAGNOSIS — F41.9 ANXIETY: ICD-10-CM

## 2024-09-30 DIAGNOSIS — K21.9 GASTROESOPHAGEAL REFLUX DISEASE, UNSPECIFIED WHETHER ESOPHAGITIS PRESENT: ICD-10-CM

## 2024-09-30 RX ORDER — HYDROXYZINE PAMOATE 25 MG/1
25 CAPSULE ORAL EVERY 6 HOURS PRN
Qty: 90 CAPSULE | Refills: 0 | Status: SHIPPED | OUTPATIENT
Start: 2024-09-30

## 2024-09-30 RX ORDER — OMEPRAZOLE 20 MG/1
20 CAPSULE, DELAYED RELEASE ORAL DAILY
Qty: 90 CAPSULE | Refills: 3 | Status: SHIPPED | OUTPATIENT
Start: 2024-09-30

## 2024-10-19 DIAGNOSIS — F41.9 ANXIETY: ICD-10-CM

## 2024-10-21 RX ORDER — HYDROXYZINE PAMOATE 25 MG/1
25 CAPSULE ORAL EVERY 6 HOURS PRN
Qty: 90 CAPSULE | Refills: 0 | Status: SHIPPED | OUTPATIENT
Start: 2024-10-21

## 2024-12-11 ENCOUNTER — APPOINTMENT (OUTPATIENT)
Dept: PRIMARY CARE | Facility: CLINIC | Age: 34
End: 2024-12-11
Payer: COMMERCIAL

## 2024-12-27 ENCOUNTER — APPOINTMENT (OUTPATIENT)
Dept: PRIMARY CARE | Facility: CLINIC | Age: 34
End: 2024-12-27
Payer: COMMERCIAL

## 2024-12-27 VITALS
WEIGHT: 270 LBS | DIASTOLIC BLOOD PRESSURE: 80 MMHG | HEIGHT: 70 IN | TEMPERATURE: 97.7 F | BODY MASS INDEX: 38.65 KG/M2 | HEART RATE: 76 BPM | SYSTOLIC BLOOD PRESSURE: 118 MMHG | RESPIRATION RATE: 16 BRPM

## 2024-12-27 DIAGNOSIS — Z11.59 ENCOUNTER FOR HEPATITIS C SCREENING TEST FOR LOW RISK PATIENT: ICD-10-CM

## 2024-12-27 DIAGNOSIS — Z00.00 ROUTINE GENERAL MEDICAL EXAMINATION AT A HEALTH CARE FACILITY: ICD-10-CM

## 2024-12-27 DIAGNOSIS — G89.29 CHRONIC BILATERAL LOW BACK PAIN WITHOUT SCIATICA: ICD-10-CM

## 2024-12-27 DIAGNOSIS — M76.62 ACHILLES TENDINITIS OF BOTH LOWER EXTREMITIES: Primary | ICD-10-CM

## 2024-12-27 DIAGNOSIS — M54.50 CHRONIC BILATERAL LOW BACK PAIN WITHOUT SCIATICA: ICD-10-CM

## 2024-12-27 DIAGNOSIS — F41.9 ANXIETY: ICD-10-CM

## 2024-12-27 DIAGNOSIS — M76.61 ACHILLES TENDINITIS OF BOTH LOWER EXTREMITIES: Primary | ICD-10-CM

## 2024-12-27 PROCEDURE — 3008F BODY MASS INDEX DOCD: CPT | Performed by: FAMILY MEDICINE

## 2024-12-27 PROCEDURE — 99214 OFFICE O/P EST MOD 30 MIN: CPT | Performed by: FAMILY MEDICINE

## 2024-12-27 PROCEDURE — 1036F TOBACCO NON-USER: CPT | Performed by: FAMILY MEDICINE

## 2024-12-27 RX ORDER — CYCLOBENZAPRINE HCL 5 MG
5 TABLET ORAL 3 TIMES DAILY PRN
Qty: 30 TABLET | Refills: 0 | Status: SHIPPED | OUTPATIENT
Start: 2024-12-27 | End: 2025-02-25

## 2024-12-27 ASSESSMENT — ENCOUNTER SYMPTOMS
PAIN: 1
BACK PAIN: 1

## 2024-12-27 NOTE — PROGRESS NOTES
Subjective   Darwin Ferrell is a 34 y.o. male who presents for Foot Pain and Back Pain.     Pain  This is a chronic problem. The problem occurs daily. The problem has been gradually worsening since onset. The pain is present in the left foot (Pain is worse in the morning, gets better throughout the day. Previous xrays show bone spurs.).   Back Pain  This is a chronic problem. Episode onset: about 5 years ago after a fall. The pain is present in the lumbar spine. Treatments tried: steroid injections. The treatment provided moderate relief.   The pain in the heels is located on the back of the heels at the insertion point of the Achilles tendon and is much worse on the left.  He says that it is severe in the morning when he first gets up and gets progressively better the more he walks on it.  It hurts a lot going downstairs but not so much going up stairs.  He does not recall any injury.    He states that he was previously on chronic pain management for his back and thinks that he has some lumbar disc problems.  He had imaging done in 2020 which we do not have present today.  He previously gotten cortisone injections in his back and was treated successfully with Flexeril.  He is requesting a refill of this    Visit Vitals  /80   Pulse 76   Temp 36.5 °C (97.7 °F)   Resp 16      Objective   Physical Exam  Constitutional:       Appearance: Normal appearance.   HENT:      Head: Normocephalic.   Pulmonary:      Effort: Pulmonary effort is normal.   Musculoskeletal:      Cervical back: Neck supple.      Comments: The pain is reproducible at the left heel right at the insertion of the Achilles tendon.  There is no pain on more proximal palpation of the Achilles or the gastroc   Skin:     General: Skin is warm and dry.   Psychiatric:         Mood and Affect: Mood normal.            Assessment & Plan  Anxiety    Orders:    Follow Up In [facility] - PCP - Established    Achilles tendinitis of both lower  extremities  This is a very classic picture of Achilles tendinitis.  I did review the imaging that was performed at the urgent care of both feet showing a very small heel spur but no inflammation at the insertion of the Achilles.  The x-rays were otherwise unremarkable.  I believe he would most likely benefit from a stretching regimen.  I printed him a handout for Achilles stretching which I asked him to do twice daily for the next 3 or 4 weeks.  If he does not have progress with this I will refer him to podiatry  Orders:    cyclobenzaprine (Flexeril) 5 mg tablet; Take 1 tablet (5 mg) by mouth 3 times a day as needed for muscle spasms.    Chronic bilateral low back pain without sciatica  He has a long history of chronic back pain and was previously seen in pain management where he had steroid injections.  He is continuing to have recurrent bilateral nonradiating back pain.  We will start with some muscle relaxer which helped him previously along with the stretching routine.  We will get copies of his previous imaging.  If the pain continues after the muscle relaxer then we will refer back to pain management       Encounter for hepatitis C screening test for low risk patient    Orders:    Hepatitis C Antibody; Future    Routine general medical examination at a health care facility    Orders:    Lipid Panel; Future    Comprehensive Metabolic Panel; Future    CBC; Future    Follow Up In [facility] - PCP; Future           Please excuse any errors in grammar or translation related to this dictation. Voice recognition software was utilized to prepare this document.

## 2024-12-27 NOTE — ASSESSMENT & PLAN NOTE
He has a long history of chronic back pain and was previously seen in pain management where he had steroid injections.  He is continuing to have recurrent bilateral nonradiating back pain.  We will start with some muscle relaxer which helped him previously along with the stretching routine.  We will get copies of his previous imaging.  If the pain continues after the muscle relaxer then we will refer back to pain management

## 2025-01-02 ENCOUNTER — LAB (OUTPATIENT)
Dept: LAB | Facility: LAB | Age: 35
End: 2025-01-02
Payer: COMMERCIAL

## 2025-01-02 DIAGNOSIS — Z11.59 ENCOUNTER FOR HEPATITIS C SCREENING TEST FOR LOW RISK PATIENT: ICD-10-CM

## 2025-01-02 DIAGNOSIS — Z00.00 ROUTINE GENERAL MEDICAL EXAMINATION AT A HEALTH CARE FACILITY: ICD-10-CM

## 2025-01-02 LAB
ALBUMIN SERPL BCP-MCNC: 4.4 G/DL (ref 3.4–5)
ALP SERPL-CCNC: 55 U/L (ref 33–120)
ALT SERPL W P-5'-P-CCNC: 61 U/L (ref 10–52)
ANION GAP SERPL CALC-SCNC: 10 MMOL/L (ref 10–20)
AST SERPL W P-5'-P-CCNC: 26 U/L (ref 9–39)
BILIRUB SERPL-MCNC: 0.5 MG/DL (ref 0–1.2)
BUN SERPL-MCNC: 10 MG/DL (ref 6–23)
CALCIUM SERPL-MCNC: 9.1 MG/DL (ref 8.6–10.3)
CHLORIDE SERPL-SCNC: 104 MMOL/L (ref 98–107)
CHOLEST SERPL-MCNC: 196 MG/DL (ref 0–199)
CHOLESTEROL/HDL RATIO: 7.6
CO2 SERPL-SCNC: 26 MMOL/L (ref 21–32)
CREAT SERPL-MCNC: 0.77 MG/DL (ref 0.5–1.3)
EGFRCR SERPLBLD CKD-EPI 2021: >90 ML/MIN/1.73M*2
ERYTHROCYTE [DISTWIDTH] IN BLOOD BY AUTOMATED COUNT: 12 % (ref 11.5–14.5)
GLUCOSE SERPL-MCNC: 105 MG/DL (ref 74–99)
HCT VFR BLD AUTO: 47.4 % (ref 41–52)
HCV AB SER QL: NONREACTIVE
HDLC SERPL-MCNC: 25.7 MG/DL
HGB BLD-MCNC: 16.6 G/DL (ref 13.5–17.5)
LDLC SERPL CALC-MCNC: 134 MG/DL
MCH RBC QN AUTO: 28.9 PG (ref 26–34)
MCHC RBC AUTO-ENTMCNC: 35 G/DL (ref 32–36)
MCV RBC AUTO: 83 FL (ref 80–100)
NON HDL CHOLESTEROL: 170 MG/DL (ref 0–149)
NRBC BLD-RTO: 0 /100 WBCS (ref 0–0)
PLATELET # BLD AUTO: 261 X10*3/UL (ref 150–450)
POTASSIUM SERPL-SCNC: 4.1 MMOL/L (ref 3.5–5.3)
PROT SERPL-MCNC: 7.1 G/DL (ref 6.4–8.2)
RBC # BLD AUTO: 5.74 X10*6/UL (ref 4.5–5.9)
SODIUM SERPL-SCNC: 136 MMOL/L (ref 136–145)
TRIGL SERPL-MCNC: 182 MG/DL (ref 0–149)
VLDL: 36 MG/DL (ref 0–40)
WBC # BLD AUTO: 7 X10*3/UL (ref 4.4–11.3)

## 2025-01-02 PROCEDURE — 80061 LIPID PANEL: CPT

## 2025-01-02 PROCEDURE — 85027 COMPLETE CBC AUTOMATED: CPT

## 2025-01-02 PROCEDURE — 80053 COMPREHEN METABOLIC PANEL: CPT

## 2025-01-02 PROCEDURE — 86803 HEPATITIS C AB TEST: CPT

## 2025-01-12 DIAGNOSIS — F41.9 ANXIETY: ICD-10-CM

## 2025-01-13 RX ORDER — HYDROXYZINE PAMOATE 25 MG/1
25 CAPSULE ORAL EVERY 6 HOURS PRN
Qty: 90 CAPSULE | Refills: 0 | Status: SHIPPED | OUTPATIENT
Start: 2025-01-13

## 2025-02-06 DIAGNOSIS — F41.9 ANXIETY: ICD-10-CM

## 2025-02-06 RX ORDER — HYDROXYZINE PAMOATE 25 MG/1
25 CAPSULE ORAL EVERY 6 HOURS PRN
Qty: 90 CAPSULE | Refills: 0 | Status: SHIPPED | OUTPATIENT
Start: 2025-02-06

## 2025-02-24 ENCOUNTER — OFFICE VISIT (OUTPATIENT)
Dept: URGENT CARE | Age: 35
End: 2025-02-24
Payer: COMMERCIAL

## 2025-02-24 VITALS
HEART RATE: 99 BPM | WEIGHT: 270 LBS | BODY MASS INDEX: 34.65 KG/M2 | OXYGEN SATURATION: 99 % | SYSTOLIC BLOOD PRESSURE: 105 MMHG | DIASTOLIC BLOOD PRESSURE: 61 MMHG | TEMPERATURE: 98.5 F | HEIGHT: 74 IN | RESPIRATION RATE: 18 BRPM

## 2025-02-24 DIAGNOSIS — R11.0 NAUSEA: ICD-10-CM

## 2025-02-24 DIAGNOSIS — R05.1 ACUTE COUGH: ICD-10-CM

## 2025-02-24 DIAGNOSIS — J10.1 INFLUENZA A: Primary | ICD-10-CM

## 2025-02-24 DIAGNOSIS — R68.89 FLU-LIKE SYMPTOMS: ICD-10-CM

## 2025-02-24 LAB
POC RAPID INFLUENZA A: POSITIVE
POC RAPID INFLUENZA B: NEGATIVE
POC SARS-COV-2 AG BINAX: NORMAL

## 2025-02-24 RX ORDER — OSELTAMIVIR PHOSPHATE 75 MG/1
75 CAPSULE ORAL EVERY 12 HOURS
Qty: 10 CAPSULE | Refills: 0 | Status: SHIPPED | OUTPATIENT
Start: 2025-02-24 | End: 2025-03-01

## 2025-02-24 RX ORDER — ONDANSETRON 4 MG/1
4 TABLET, ORALLY DISINTEGRATING ORAL EVERY 8 HOURS PRN
Qty: 20 TABLET | Refills: 0 | Status: SHIPPED | OUTPATIENT
Start: 2025-02-24 | End: 2025-03-03

## 2025-02-24 RX ORDER — BROMPHENIRAMINE MALEATE, PSEUDOEPHEDRINE HYDROCHLORIDE, AND DEXTROMETHORPHAN HYDROBROMIDE 2; 30; 10 MG/5ML; MG/5ML; MG/5ML
5 SYRUP ORAL 4 TIMES DAILY PRN
Qty: 120 ML | Refills: 0 | Status: SHIPPED | OUTPATIENT
Start: 2025-02-24 | End: 2025-03-06

## 2025-02-24 ASSESSMENT — ENCOUNTER SYMPTOMS
ALLERGIC/IMMUNOLOGIC NEGATIVE: 1
NEUROLOGICAL NEGATIVE: 1
NAUSEA: 1
ENDOCRINE NEGATIVE: 1
MYALGIAS: 1
EYES NEGATIVE: 1
PSYCHIATRIC NEGATIVE: 1
HEMATOLOGIC/LYMPHATIC NEGATIVE: 1
COUGH: 1
PALPITATIONS: 0
CHILLS: 1
FATIGUE: 1

## 2025-02-24 NOTE — LETTER
February 24, 2025     Patient: Darwin Ferrell   YOB: 1990   Date of Visit: 2/24/2025       To Whom It May Concern:    It is my medical opinion that Darwin Ferrell may return to work on 2/26/25 .    If you have any questions or concerns, please don't hesitate to call.         Sincerely,        ANGEL Marquis-CNP    CC: No Recipients

## 2025-02-24 NOTE — PROGRESS NOTES
Subjective   Patient ID: Darwin Ferrell is a 34 y.o. male. They present today with a chief complaint of Illness (X4 days headache, vomiting, coughing, chills, sweats, post nasal drip, nasal congestion, sore throats, intermittent sharp pains in left side hip radiating down leg. Has tried Tylenol and IBU with mild relief.).    History of Present Illness  HPI    Pt presents to urgent care with c/o  headache, nausea, cough, chills, congestion, sore throat, body aches x 3 days .  Pt denies CP, SOB, palpitations, fevers, abd pain, n/v/d, sick contacts, recent travel.        Past Medical History  Allergies as of 02/24/2025 - Reviewed 02/24/2025   Allergen Reaction Noted    Formaldehyde Other 06/19/2024    Propolis (bee glue) Rash 06/19/2024       (Not in a hospital admission)       Past Medical History:   Diagnosis Date    Anxiety        Past Surgical History:   Procedure Laterality Date    OTHER SURGICAL HISTORY  11/16/2022    Leg surgery    OTHER SURGICAL HISTORY  11/16/2022    [place] tooth extraction        reports that he has never smoked. He has never used smokeless tobacco. He reports that he does not drink alcohol and does not use drugs.    Review of Systems  Review of Systems   Constitutional:  Positive for chills and fatigue.   HENT:  Positive for congestion.    Eyes: Negative.    Respiratory:  Positive for cough.    Cardiovascular:  Negative for chest pain and palpitations.   Gastrointestinal:  Positive for nausea.   Endocrine: Negative.    Genitourinary: Negative.    Musculoskeletal:  Positive for myalgias.   Skin: Negative.    Allergic/Immunologic: Negative.    Neurological: Negative.    Hematological: Negative.    Psychiatric/Behavioral: Negative.     All other systems reviewed and are negative.                                 Objective    Vitals:    02/24/25 1019   BP: 105/61   BP Location: Right arm   Patient Position: Sitting   BP Cuff Size: Large adult   Pulse: 99   Resp: 18   Temp: 36.9 °C (98.5 °F)  "  TempSrc: Temporal   SpO2: 99%   Weight: 122 kg (270 lb)   Height: 1.88 m (6' 2\")     No LMP for male patient.    Physical Exam  Vitals and nursing note reviewed.   Constitutional:       General: He is not in acute distress.     Appearance: Normal appearance. He is not ill-appearing or toxic-appearing.   HENT:      Head: Atraumatic.      Right Ear: Tympanic membrane, ear canal and external ear normal.      Left Ear: Tympanic membrane, ear canal and external ear normal.      Nose: Congestion present.      Mouth/Throat:      Mouth: Mucous membranes are moist.      Pharynx: Oropharynx is clear.   Eyes:      Extraocular Movements: Extraocular movements intact.      Conjunctiva/sclera: Conjunctivae normal.      Pupils: Pupils are equal, round, and reactive to light.   Cardiovascular:      Rate and Rhythm: Normal rate.      Pulses: Normal pulses.      Heart sounds: Normal heart sounds.   Pulmonary:      Effort: Pulmonary effort is normal.      Breath sounds: Normal breath sounds.   Skin:     General: Skin is warm and dry.   Neurological:      General: No focal deficit present.      Mental Status: He is alert and oriented to person, place, and time.   Psychiatric:         Mood and Affect: Mood normal.         Behavior: Behavior normal.         Thought Content: Thought content normal.         Procedures    Point of Care Test & Imaging Results from this visit  Results for orders placed or performed in visit on 02/24/25   POCT Covid-19 Rapid Antigen   Result Value Ref Range    POC RAMEZ-COV-2 AG  Presumptive negative test for SARS-CoV-2 (no antigen detected)     Presumptive negative test for SARS-CoV-2 (no antigen detected)   POCT Influenza A/B manually resulted   Result Value Ref Range    POC Rapid Influenza A Positive (A) Negative    POC Rapid Influenza B Negative Negative      No results found.    Diagnostic study results (if any) were reviewed by PASCUAL Marquis.    Assessment/Plan   Allergies, medications, " history, and pertinent labs/EKGs/Imaging reviewed by PASCUAL Marquis.     Medical Decision Making  Covid negative.  Influneza A positive.  Suspect flu as cause of pt's symptoms.  Will dc home with rx tamiflu, bromfed, zofran.  Recommend supportive care, otc tylenol/motrin as needed, increae oral fluids, rest.  At time of discharge patient was clinically well-appearing and HDS for outpatient management. The patient was educated regarding diagnosis, supportive care, OTC and Rx medications. The patient was given the opportunity to ask questions prior to discharge.  They verbalized understanding of my discussion of the plans for treatment, expected course, indications to return to  or seek further evaluation in ED, and the need for timely follow up as directed.   They were provided with a workl excuse as requested.       Orders and Diagnoses  Diagnoses and all orders for this visit:  Influenza A  -     oseltamivir (Tamiflu) 75 mg capsule; Take 1 capsule (75 mg) by mouth every 12 hours for 5 days.  Flu-like symptoms  -     POCT Covid-19 Rapid Antigen  -     POCT Influenza A/B manually resulted  Acute cough  -     brompheniramine-pseudoeph-DM 2-30-10 mg/5 mL syrup; Take 5 mL by mouth 4 times a day as needed for congestion or cough for up to 10 days.  Nausea  -     ondansetron ODT (Zofran-ODT) 4 mg disintegrating tablet; Dissolve 1 tablet (4 mg) in the mouth every 8 hours if needed for nausea or vomiting for up to 7 days.      Medical Admin Record      Patient disposition: Home    Electronically signed by PASCUAL Marquis  11:24 AM

## 2025-02-27 ENCOUNTER — TELEMEDICINE (OUTPATIENT)
Dept: PRIMARY CARE | Facility: CLINIC | Age: 35
End: 2025-02-27
Payer: COMMERCIAL

## 2025-02-27 DIAGNOSIS — B37.2 CANDIDIASIS OF SKIN: Primary | ICD-10-CM

## 2025-02-27 DIAGNOSIS — B37.2 INTERTRIGINOUS CANDIDIASIS: ICD-10-CM

## 2025-02-27 PROCEDURE — 99213 OFFICE O/P EST LOW 20 MIN: CPT

## 2025-02-27 RX ORDER — NYSTATIN 100000 [USP'U]/G
1 POWDER TOPICAL 2 TIMES DAILY
Qty: 60 G | Refills: 0 | Status: SHIPPED | OUTPATIENT
Start: 2025-02-27 | End: 2025-03-13

## 2025-02-27 RX ORDER — NYSTATIN 100000 U/G
CREAM TOPICAL 2 TIMES DAILY
Qty: 30 G | Refills: 0 | Status: SHIPPED | OUTPATIENT
Start: 2025-02-27 | End: 2025-03-06

## 2025-02-27 ASSESSMENT — ENCOUNTER SYMPTOMS
DIARRHEA: 0
EYE PAIN: 0
VOMITING: 0
COUGH: 0

## 2025-02-28 NOTE — PROGRESS NOTES
On Demand Virtual Visit Patient Consent     This visit was completed via video conference. All issues as below were discussed and addressed but no physical exam was performed. If it was felt that the patient should be evaluated in clinic than they were directed there. The patient verbally consented to the visit.    An interactive audio and video telecommunication system which permits real time communications between the patient (at the originating site) and provider (at the distant site) was utilized to provide this telehealth service.   Verbal consent was requested and obtained from Darwin Ferrell (or parent if under 18) 02/27/25 for a telehealth visit.   I have verbally confirmed with Darwin Ferrell (or parent if under 18) that they are physically located in the [facility] during this virtual visit.      Subjective   Patient ID: Darwin Ferrell is a 34 y.o. male who presents for Rash.  Rash  This is a new problem. The current episode started in the past 7 days. The problem has been gradually worsening since onset. Location: panis fold. The rash is characterized by redness. He was exposed to nothing (endorses having been in bed the last couple of days due to flu). Pertinent negatives include no cough, diarrhea, eye pain, facial edema, joint pain or vomiting.       Review of Systems   Eyes:  Negative for pain.   Respiratory:  Negative for cough.    Gastrointestinal:  Negative for diarrhea and vomiting.   Musculoskeletal:  Negative for joint pain.   Skin:  Positive for rash.       Objective     There were no vitals taken for this visit.       Physical Exam  Constitutional:       General: He is not in acute distress.     Appearance: Normal appearance. He is not ill-appearing.   Skin:     Findings: Erythema and rash present.             Comments: Pt had pictures on his phone of the rash in his groin which was red and moist   Neurological:      Mental Status: He is alert.       All questions were answered and need for  follow-up/in person care was reviewed.      Assessment/Plan   Darwin was seen today for rash.  Diagnoses and all orders for this visit:  Candidiasis of skin  Comments:  reccommended purchasing interdry to fold into groin to help with mositure control  Orders:  -     nystatin (Mycostatin) cream; Apply topically 2 times a day for 7 days.  -     nystatin (Mycostatin) 100,000 unit/gram powder; Apply 1 Application topically 2 times a day for 14 days.  Intertriginous candidiasis  -     nystatin (Mycostatin) cream; Apply topically 2 times a day for 7 days.  -     nystatin (Mycostatin) 100,000 unit/gram powder; Apply 1 Application topically 2 times a day for 14 days.

## 2025-03-14 DIAGNOSIS — F32.A DEPRESSION, UNSPECIFIED DEPRESSION TYPE: ICD-10-CM

## 2025-03-14 DIAGNOSIS — F41.9 ANXIETY: ICD-10-CM

## 2025-03-14 RX ORDER — BUSPIRONE HYDROCHLORIDE 30 MG/1
30 TABLET ORAL 2 TIMES DAILY
Qty: 180 TABLET | Refills: 0 | Status: SHIPPED | OUTPATIENT
Start: 2025-03-14

## 2025-03-14 RX ORDER — VENLAFAXINE 75 MG/1
75 TABLET ORAL DAILY
Qty: 90 TABLET | Refills: 0 | Status: SHIPPED | OUTPATIENT
Start: 2025-03-14

## 2025-03-28 ENCOUNTER — APPOINTMENT (OUTPATIENT)
Dept: PRIMARY CARE | Facility: CLINIC | Age: 35
End: 2025-03-28
Payer: COMMERCIAL

## 2025-03-30 DIAGNOSIS — M76.61 ACHILLES TENDINITIS OF BOTH LOWER EXTREMITIES: ICD-10-CM

## 2025-03-30 DIAGNOSIS — M76.62 ACHILLES TENDINITIS OF BOTH LOWER EXTREMITIES: ICD-10-CM

## 2025-03-31 DIAGNOSIS — F41.9 ANXIETY: ICD-10-CM

## 2025-03-31 RX ORDER — HYDROXYZINE PAMOATE 25 MG/1
25 CAPSULE ORAL EVERY 6 HOURS PRN
Qty: 90 CAPSULE | Refills: 0 | Status: SHIPPED | OUTPATIENT
Start: 2025-03-31

## 2025-03-31 RX ORDER — CYCLOBENZAPRINE HCL 5 MG
5 TABLET ORAL 3 TIMES DAILY PRN
Qty: 30 TABLET | Refills: 0 | Status: SHIPPED | OUTPATIENT
Start: 2025-03-31

## 2025-04-10 ENCOUNTER — APPOINTMENT (OUTPATIENT)
Dept: PRIMARY CARE | Facility: CLINIC | Age: 35
End: 2025-04-10
Payer: COMMERCIAL

## 2025-04-10 VITALS
DIASTOLIC BLOOD PRESSURE: 76 MMHG | TEMPERATURE: 97.9 F | RESPIRATION RATE: 18 BRPM | WEIGHT: 264 LBS | HEART RATE: 96 BPM | HEIGHT: 70 IN | BODY MASS INDEX: 37.8 KG/M2 | SYSTOLIC BLOOD PRESSURE: 114 MMHG

## 2025-04-10 DIAGNOSIS — Z00.00 WELL ADULT EXAM: Primary | ICD-10-CM

## 2025-04-10 DIAGNOSIS — E66.09 CLASS 2 OBESITY DUE TO EXCESS CALORIES WITHOUT SERIOUS COMORBIDITY WITH BODY MASS INDEX (BMI) OF 37.0 TO 37.9 IN ADULT: ICD-10-CM

## 2025-04-10 DIAGNOSIS — K21.9 GASTROESOPHAGEAL REFLUX DISEASE WITHOUT ESOPHAGITIS: ICD-10-CM

## 2025-04-10 DIAGNOSIS — E66.812 CLASS 2 OBESITY DUE TO EXCESS CALORIES WITHOUT SERIOUS COMORBIDITY WITH BODY MASS INDEX (BMI) OF 37.0 TO 37.9 IN ADULT: ICD-10-CM

## 2025-04-10 PROCEDURE — 99395 PREV VISIT EST AGE 18-39: CPT | Performed by: FAMILY MEDICINE

## 2025-04-10 PROCEDURE — 3008F BODY MASS INDEX DOCD: CPT | Performed by: FAMILY MEDICINE

## 2025-04-10 PROCEDURE — 1036F TOBACCO NON-USER: CPT | Performed by: FAMILY MEDICINE

## 2025-04-10 NOTE — PROGRESS NOTES
Subjective   Darwin Ferrell is a 35 y.o. male who presents for a wellness visit.  HPI    Review of Systems  Hearing/Vision screen:No results found.   Visit Vitals  /76   Pulse 96   Temp 36.6 °C (97.9 °F)   Resp 18      Objective   Physical Exam  Constitutional:       Appearance: Normal appearance.   HENT:      Head: Normocephalic.   Eyes:      Conjunctiva/sclera: Conjunctivae normal.   Cardiovascular:      Rate and Rhythm: Normal rate and regular rhythm.      Heart sounds: Normal heart sounds.   Pulmonary:      Effort: Pulmonary effort is normal.      Breath sounds: Normal breath sounds.   Musculoskeletal:      Cervical back: Neck supple.   Skin:     General: Skin is warm and dry.   Neurological:      Mental Status: He is alert.         Assessment & Plan  Well adult exam  We reviewed blood work which was done previously showing a normal CBC, CMP and lipid panel.  He has a slightly elevated LDL but his total cholesterol is less than 200.  I advised that he focus on diet exercise and weight loss to reduce his risk for cardiovascular disease.  Orders:    Follow Up In [facility] - PCP; Future    Class 2 obesity due to excess calories without serious comorbidity with body mass index (BMI) of 37.0 to 37.9 in adult  Unfortunately weight continues to be a problem and he has had difficulty achieving results with regular diet and exercise.  He has previously responded well to a GLP-1 type medication and I think this would be an excellent choice to resume.  We discussed the pros and cons of a medicine like Zepbound and the mechanism of action.  We will go ahead and get this started and also make a referral to clinical pharmacy to help with the dose titration and prior authorization if needed.  Orders:    tirzepatide, weight loss, (Zepbound) 2.5 mg/0.5 mL injection; Inject 2.5 mg under the skin every 7 days.    Referral to Clinical Pharmacy; Future    Gastroesophageal reflux disease without esophagitis  He is  currently taking a PPI for symptom control in the morning.  He is describing a sensation of nausea and occasional dry heaving when he first wakes up in the morning that goes away very shortly and does not return for the rest of the day.  I think he may be getting a little bit of accumulated stomach acid in the morning when his omeprazole is wearing off.  I suggested that he move the dosing of this medication to bedtime at least 1 hour after supper.  In this way he will be getting the maximum acid suppression in the morning when his symptoms seem to be the worst.              Please excuse any errors in grammar or translation related to this dictation. Voice recognition software was utilized to prepare this document.

## 2025-04-10 NOTE — ASSESSMENT & PLAN NOTE
He is currently taking a PPI for symptom control in the morning.  He is describing a sensation of nausea and occasional dry heaving when he first wakes up in the morning that goes away very shortly and does not return for the rest of the day.  I think he may be getting a little bit of accumulated stomach acid in the morning when his omeprazole is wearing off.  I suggested that he move the dosing of this medication to bedtime at least 1 hour after supper.  In this way he will be getting the maximum acid suppression in the morning when his symptoms seem to be the worst.

## 2025-04-10 NOTE — ASSESSMENT & PLAN NOTE
Unfortunately weight continues to be a problem and he has had difficulty achieving results with regular diet and exercise.  He has previously responded well to a GLP-1 type medication and I think this would be an excellent choice to resume.  We discussed the pros and cons of a medicine like Zepbound and the mechanism of action.  We will go ahead and get this started and also make a referral to clinical pharmacy to help with the dose titration and prior authorization if needed.  Orders:    tirzepatide, weight loss, (Zepbound) 2.5 mg/0.5 mL injection; Inject 2.5 mg under the skin every 7 days.    Referral to Clinical Pharmacy; Future

## 2025-04-18 ENCOUNTER — TELEMEDICINE (OUTPATIENT)
Dept: PHARMACY | Facility: HOSPITAL | Age: 35
End: 2025-04-18
Payer: COMMERCIAL

## 2025-04-18 DIAGNOSIS — E66.09 CLASS 2 OBESITY DUE TO EXCESS CALORIES WITHOUT SERIOUS COMORBIDITY WITH BODY MASS INDEX (BMI) OF 37.0 TO 37.9 IN ADULT: ICD-10-CM

## 2025-04-18 DIAGNOSIS — E66.812 CLASS 2 OBESITY DUE TO EXCESS CALORIES WITHOUT SERIOUS COMORBIDITY WITH BODY MASS INDEX (BMI) OF 37.0 TO 37.9 IN ADULT: ICD-10-CM

## 2025-04-18 NOTE — PROGRESS NOTES
"  Clinical Pharmacy Appointment    Patient ID: [id number] Darwin Ferrell is a 35 y.o. male who presents for First appointment. Reason for Referral: weight loss Referring Provider: Morris Marin MD    Subjective     PMH:   Medical History[1]     Family Hx:  family history includes Hypertension in his father and mother.     Social Hx:  Patient is a       HISTORY OF PRESENT ILLNESS    WEIGHT LOSS    BMI Readings from Last 3 Encounters:   04/10/25 37.88 kg/m²   02/24/25 34.67 kg/m²   12/27/24 38.74 kg/m²    Medication System Management  Patient's preferred pharmacy:     [id number] - [place], [place] - [address] AT [facility]  [address] [facility] [address]  Phone: [phone] Fax: [phone]    [place], [place] - [address]  [address] [facility] [address]  Phone: [phone] Fax: [phone]    [id number] - [place], [place] - [address]  [address] [facility] [address]  Phone: [phone] Fax: [phone]     Medications Ordered Prior to Encounter[2]    OBJECTIVE    LAB  Lab Results   Component Value Date    BILITOT 0.5 01/02/2025    CALCIUM 9.1 01/02/2025    CO2 26 01/02/2025     01/02/2025    CREATININE 0.77 01/02/2025    GLUCOSE 105 (H) 01/02/2025    ALKPHOS 55 01/02/2025    K 4.1 01/02/2025    PROT 7.1 01/02/2025     01/02/2025    AST 26 01/02/2025    ALT 61 (H) 01/02/2025    BUN 10 01/02/2025    ANIONGAP 10 01/02/2025    ALBUMIN 4.4 01/02/2025    EGFR >90 01/02/2025     Lab Results   Component Value Date    TRIG 182 (H) 01/02/2025    CHOL 196 01/02/2025    LDLCALC 134 (H) 01/02/2025    HDL 25.7 01/02/2025     No results found for: \"HGBA1C\"    ASSESSMENT AND PLAN     Patient's Mounjaro PA was denies by insurance. He is going to call his insurance to see if they cover weight loss medications and give me a call back.    Continue all meds under the continuation of care with the referring provider and clinical pharmacy team.    Chantel SWAIN " Luna White   Clinical Pharmacist Specialist, Primary Care   Phone: [phone]   Fax: [phone]   Email: [email or web address]    Verbal consent to manage patient's drug therapy was obtained from the patient. They were informed they may decline to participate or withdraw from participation in pharmacy services at any time           [1]   Past Medical History:  Diagnosis Date    Anxiety    [2]   Current Outpatient Medications on File Prior to Visit   Medication Sig Dispense Refill    busPIRone (Buspar) 30 mg tablet Take 1 tablet (30 mg) by mouth 2 times a day. 180 tablet 0    cholecalciferol (Vitamin D-3) 50 MCG (2000 UT) tablet Take 1 tablet (50 mcg) by mouth once daily.      cyclobenzaprine (Flexeril) 5 mg tablet TAKE 1 TABLET (5 MG) BY MOUTH 3 TIMES A DAY AS NEEDED FOR MUSCLE SPASM 30 tablet 0    hydrOXYzine pamoate (Vistaril) 25 mg capsule TAKE 1 CAPSULE (25 MG) BY MOUTH EVERY 6 HOURS IF NEEDED FOR ANXIETY. 90 capsule 0    ketoconazole (NIZOral) 2 % shampoo Apply topically 2 times a week. Shampoo daily, leave on for 5-10 minutes, then rinse. 120 mL 0    melatonin 10 mg tablet Take 0.5 tablets by mouth once daily at bedtime.      omeprazole (PriLOSEC) 20 mg DR capsule Take 1 capsule (20 mg) by mouth once daily. 90 capsule 3    tirzepatide (Mounjaro) 2.5 mg/0.5 mL pen injector Inject 2.5 mg under the skin 1 (one) time per week. 2 mL 0    venlafaxine (Effexor) 75 mg tablet Take 1 tablet (75 mg) by mouth once daily. 90 tablet 0    [DISCONTINUED] tirzepatide, weight loss, (Zepbound) 2.5 mg/0.5 mL injection Inject 2.5 mg under the skin every 7 days. 4 each 0     No current facility-administered medications on file prior to visit.

## 2025-06-26 DIAGNOSIS — K21.9 GASTROESOPHAGEAL REFLUX DISEASE, UNSPECIFIED WHETHER ESOPHAGITIS PRESENT: ICD-10-CM

## 2025-06-26 DIAGNOSIS — F32.A DEPRESSION, UNSPECIFIED DEPRESSION TYPE: ICD-10-CM

## 2025-06-26 DIAGNOSIS — F41.9 ANXIETY: ICD-10-CM

## 2025-06-26 RX ORDER — VENLAFAXINE 75 MG/1
75 TABLET ORAL DAILY
Qty: 90 TABLET | Refills: 3 | Status: SHIPPED | OUTPATIENT
Start: 2025-06-26

## 2025-06-26 RX ORDER — OMEPRAZOLE 20 MG/1
20 CAPSULE, DELAYED RELEASE ORAL DAILY
Qty: 90 CAPSULE | Refills: 3 | Status: SHIPPED | OUTPATIENT
Start: 2025-06-26

## 2025-06-26 RX ORDER — BUSPIRONE HYDROCHLORIDE 30 MG/1
30 TABLET ORAL 2 TIMES DAILY
Qty: 180 TABLET | Refills: 3 | Status: SHIPPED | OUTPATIENT
Start: 2025-06-26

## 2025-08-01 ENCOUNTER — HOSPITAL ENCOUNTER (INPATIENT)
Age: 35
LOS: 2 days | Discharge: HOME | DRG: 149 | End: 2025-08-03
Attending: INTERNAL MEDICINE | Admitting: INTERNAL MEDICINE
Payer: COMMERCIAL

## 2025-08-01 ENCOUNTER — APPOINTMENT (OUTPATIENT)
Dept: CT IMAGING | Age: 35
DRG: 149 | End: 2025-08-01
Payer: COMMERCIAL

## 2025-08-01 DIAGNOSIS — R42 DIZZINESS: Primary | ICD-10-CM

## 2025-08-01 LAB
ALBUMIN SERPL-MCNC: 4.1 G/DL (ref 3.5–4.6)
ALBUMIN SERPL-MCNC: 4.4 G/DL (ref 3.5–4.6)
ALP SERPL-CCNC: 60 U/L (ref 35–104)
ALP SERPL-CCNC: 68 U/L (ref 35–104)
ALT SERPL-CCNC: 53 U/L (ref 0–41)
ALT SERPL-CCNC: 61 U/L (ref 0–41)
ANION GAP SERPL CALCULATED.3IONS-SCNC: 11 MEQ/L (ref 9–15)
ANION GAP SERPL CALCULATED.3IONS-SCNC: 16 MEQ/L (ref 9–15)
AST SERPL-CCNC: 23 U/L (ref 0–40)
AST SERPL-CCNC: 33 U/L (ref 0–40)
BASE EXCESS VENOUS: -1 (ref -3–3)
BASOPHILS # BLD: 0 K/UL (ref 0–0.2)
BASOPHILS NFR BLD: 0.2 %
BILIRUB SERPL-MCNC: 0.4 MG/DL (ref 0.2–0.7)
BILIRUB SERPL-MCNC: 0.6 MG/DL (ref 0.2–0.7)
BUN SERPL-MCNC: 10 MG/DL (ref 6–20)
BUN SERPL-MCNC: 10 MG/DL (ref 6–20)
CALCIUM IONIZED: 1.06 MMOL/L (ref 1.12–1.32)
CALCIUM SERPL-MCNC: 8.3 MG/DL (ref 8.5–9.9)
CALCIUM SERPL-MCNC: 8.8 MG/DL (ref 8.5–9.9)
CHLORIDE SERPL-SCNC: 100 MEQ/L (ref 95–107)
CHLORIDE SERPL-SCNC: 105 MEQ/L (ref 95–107)
CO2 SERPL-SCNC: 18 MEQ/L (ref 20–31)
CO2 SERPL-SCNC: 20 MEQ/L (ref 20–31)
CREAT SERPL-MCNC: 0.55 MG/DL (ref 0.7–1.2)
CREAT SERPL-MCNC: 0.67 MG/DL (ref 0.7–1.2)
EKG ATRIAL RATE: 73 BPM
EKG DIAGNOSIS: NORMAL
EKG P AXIS: 28 DEGREES
EKG P-R INTERVAL: 134 MS
EKG Q-T INTERVAL: 380 MS
EKG QRS DURATION: 90 MS
EKG QTC CALCULATION (BAZETT): 418 MS
EKG R AXIS: 45 DEGREES
EKG T AXIS: 10 DEGREES
EKG VENTRICULAR RATE: 73 BPM
EOSINOPHIL # BLD: 0 K/UL (ref 0–0.7)
EOSINOPHIL NFR BLD: 0 %
ERYTHROCYTE [DISTWIDTH] IN BLOOD BY AUTOMATED COUNT: 11.9 % (ref 11.5–14.5)
GLOBULIN SER CALC-MCNC: 2.5 G/DL (ref 2.3–3.5)
GLOBULIN SER CALC-MCNC: 2.6 G/DL (ref 2.3–3.5)
GLUCOSE BLD-MCNC: 120 MG/DL (ref 70–99)
GLUCOSE SERPL-MCNC: 105 MG/DL (ref 70–99)
GLUCOSE SERPL-MCNC: 191 MG/DL (ref 70–99)
HCO3 VENOUS: 23.1 MMOL/L (ref 23–29)
HCT VFR BLD AUTO: 43 % (ref 41–53)
HCT VFR BLD AUTO: 48.4 % (ref 42–52)
HGB BLD CALC-MCNC: 14.7 GM/DL (ref 13.5–17.5)
HGB BLD-MCNC: 16.8 G/DL (ref 14–18)
LACTATE: 2.31 MMOL/L (ref 0.4–2)
LIPASE SERPL-CCNC: 16 U/L (ref 12–95)
LYMPHOCYTES # BLD: 1.2 K/UL (ref 1–4.8)
LYMPHOCYTES NFR BLD: 9.4 %
MCH RBC QN AUTO: 29.1 PG (ref 27–31.3)
MCHC RBC AUTO-ENTMCNC: 34.7 % (ref 33–37)
MCV RBC AUTO: 83.7 FL (ref 79–92.2)
MONOCYTES # BLD: 0.3 K/UL (ref 0.2–0.8)
MONOCYTES NFR BLD: 2.3 %
NEUTROPHILS # BLD: 10.8 K/UL (ref 1.4–6.5)
NEUTS SEG NFR BLD: 87.6 %
O2 SAT, VEN: 96 %
PCO2 VENOUS: 32.8 MM HG (ref 40–50)
PERFORMED ON: ABNORMAL
PH VENOUS: 7.46 (ref 7.32–7.42)
PLATELET # BLD AUTO: 280 K/UL (ref 130–400)
PO2 VENOUS: 77 MM HG
POC CHLORIDE: 111 MEQ/L (ref 99–110)
POC CREATININE: 0.5 MG/DL (ref 0.8–1.3)
POC SAMPLE TYPE: ABNORMAL
POTASSIUM SERPL-SCNC: 3.9 MEQ/L (ref 3.5–5.1)
POTASSIUM SERPL-SCNC: 4.1 MEQ/L (ref 3.4–4.9)
POTASSIUM SERPL-SCNC: 5.8 MEQ/L (ref 3.4–4.9)
PROT SERPL-MCNC: 6.6 G/DL (ref 6.3–8)
PROT SERPL-MCNC: 7 G/DL (ref 6.3–8)
RBC # BLD AUTO: 5.78 M/UL (ref 4.7–6.1)
SODIUM BLD-SCNC: 144 MEQ/L (ref 136–145)
SODIUM SERPL-SCNC: 134 MEQ/L (ref 135–144)
SODIUM SERPL-SCNC: 136 MEQ/L (ref 135–144)
TCO2 CALC VENOUS: 24 MMOL/L
WBC # BLD AUTO: 12.3 K/UL (ref 4.8–10.8)

## 2025-08-01 PROCEDURE — 99285 EMERGENCY DEPT VISIT HI MDM: CPT

## 2025-08-01 PROCEDURE — 6360000002 HC RX W HCPCS: Performed by: PHYSICIAN ASSISTANT

## 2025-08-01 PROCEDURE — 82435 ASSAY OF BLOOD CHLORIDE: CPT

## 2025-08-01 PROCEDURE — 96375 TX/PRO/DX INJ NEW DRUG ADDON: CPT

## 2025-08-01 PROCEDURE — 6360000002 HC RX W HCPCS: Performed by: INTERNAL MEDICINE

## 2025-08-01 PROCEDURE — 6370000000 HC RX 637 (ALT 250 FOR IP)

## 2025-08-01 PROCEDURE — 80053 COMPREHEN METABOLIC PANEL: CPT

## 2025-08-01 PROCEDURE — 6370000000 HC RX 637 (ALT 250 FOR IP): Performed by: INTERNAL MEDICINE

## 2025-08-01 PROCEDURE — 36600 WITHDRAWAL OF ARTERIAL BLOOD: CPT

## 2025-08-01 PROCEDURE — 2580000003 HC RX 258: Performed by: INTERNAL MEDICINE

## 2025-08-01 PROCEDURE — 82565 ASSAY OF CREATININE: CPT

## 2025-08-01 PROCEDURE — 82330 ASSAY OF CALCIUM: CPT

## 2025-08-01 PROCEDURE — 82803 BLOOD GASES ANY COMBINATION: CPT

## 2025-08-01 PROCEDURE — 83690 ASSAY OF LIPASE: CPT

## 2025-08-01 PROCEDURE — 2500000003 HC RX 250 WO HCPCS: Performed by: INTERNAL MEDICINE

## 2025-08-01 PROCEDURE — 83605 ASSAY OF LACTIC ACID: CPT

## 2025-08-01 PROCEDURE — 96361 HYDRATE IV INFUSION ADD-ON: CPT

## 2025-08-01 PROCEDURE — 1210000000 HC MED SURG R&B

## 2025-08-01 PROCEDURE — 6370000000 HC RX 637 (ALT 250 FOR IP): Performed by: PHYSICIAN ASSISTANT

## 2025-08-01 PROCEDURE — 70450 CT HEAD/BRAIN W/O DYE: CPT

## 2025-08-01 PROCEDURE — 84295 ASSAY OF SERUM SODIUM: CPT

## 2025-08-01 PROCEDURE — 93005 ELECTROCARDIOGRAM TRACING: CPT | Performed by: PHYSICIAN ASSISTANT

## 2025-08-01 PROCEDURE — 85025 COMPLETE CBC W/AUTO DIFF WBC: CPT

## 2025-08-01 PROCEDURE — 93010 ELECTROCARDIOGRAM REPORT: CPT | Performed by: INTERNAL MEDICINE

## 2025-08-01 PROCEDURE — 2580000003 HC RX 258: Performed by: PHYSICIAN ASSISTANT

## 2025-08-01 PROCEDURE — 96374 THER/PROPH/DIAG INJ IV PUSH: CPT

## 2025-08-01 PROCEDURE — 85014 HEMATOCRIT: CPT

## 2025-08-01 PROCEDURE — 84132 ASSAY OF SERUM POTASSIUM: CPT

## 2025-08-01 RX ORDER — VENLAFAXINE 75 MG/1
75 TABLET ORAL DAILY
COMMUNITY

## 2025-08-01 RX ORDER — SODIUM CHLORIDE 9 MG/ML
INJECTION, SOLUTION INTRAVENOUS PRN
Status: DISCONTINUED | OUTPATIENT
Start: 2025-08-01 | End: 2025-08-03 | Stop reason: HOSPADM

## 2025-08-01 RX ORDER — MECLIZINE HYDROCHLORIDE 25 MG/1
25 TABLET ORAL ONCE
Status: COMPLETED | OUTPATIENT
Start: 2025-08-01 | End: 2025-08-01

## 2025-08-01 RX ORDER — VENLAFAXINE 75 MG/1
75 TABLET ORAL DAILY
Status: DISCONTINUED | OUTPATIENT
Start: 2025-08-01 | End: 2025-08-03 | Stop reason: HOSPADM

## 2025-08-01 RX ORDER — BUSPIRONE HYDROCHLORIDE 7.5 MG/1
30 TABLET ORAL 2 TIMES DAILY
Status: DISCONTINUED | OUTPATIENT
Start: 2025-08-01 | End: 2025-08-03 | Stop reason: HOSPADM

## 2025-08-01 RX ORDER — ACETAMINOPHEN 650 MG/1
650 SUPPOSITORY RECTAL EVERY 6 HOURS PRN
Status: DISCONTINUED | OUTPATIENT
Start: 2025-08-01 | End: 2025-08-03 | Stop reason: HOSPADM

## 2025-08-01 RX ORDER — POTASSIUM CHLORIDE 7.45 MG/ML
10 INJECTION INTRAVENOUS PRN
Status: DISCONTINUED | OUTPATIENT
Start: 2025-08-01 | End: 2025-08-03 | Stop reason: HOSPADM

## 2025-08-01 RX ORDER — PANTOPRAZOLE SODIUM 40 MG/1
40 TABLET, DELAYED RELEASE ORAL
Status: DISCONTINUED | OUTPATIENT
Start: 2025-08-02 | End: 2025-08-03 | Stop reason: HOSPADM

## 2025-08-01 RX ORDER — MECLIZINE HYDROCHLORIDE 25 MG/1
25 TABLET ORAL 3 TIMES DAILY PRN
Status: DISCONTINUED | OUTPATIENT
Start: 2025-08-01 | End: 2025-08-02

## 2025-08-01 RX ORDER — POTASSIUM CHLORIDE 1500 MG/1
40 TABLET, EXTENDED RELEASE ORAL PRN
Status: DISCONTINUED | OUTPATIENT
Start: 2025-08-01 | End: 2025-08-03 | Stop reason: HOSPADM

## 2025-08-01 RX ORDER — POLYETHYLENE GLYCOL 3350 17 G/17G
17 POWDER, FOR SOLUTION ORAL DAILY PRN
Status: DISCONTINUED | OUTPATIENT
Start: 2025-08-01 | End: 2025-08-03 | Stop reason: HOSPADM

## 2025-08-01 RX ORDER — ONDANSETRON 4 MG/1
4 TABLET, ORALLY DISINTEGRATING ORAL EVERY 8 HOURS PRN
Status: DISCONTINUED | OUTPATIENT
Start: 2025-08-01 | End: 2025-08-03

## 2025-08-01 RX ORDER — HYDROXYZINE PAMOATE 25 MG/1
25 CAPSULE ORAL
COMMUNITY

## 2025-08-01 RX ORDER — OMEPRAZOLE 20 MG/1
20 CAPSULE, DELAYED RELEASE ORAL DAILY
COMMUNITY

## 2025-08-01 RX ORDER — HYDROXYZINE PAMOATE 25 MG/1
25 CAPSULE ORAL
Status: DISCONTINUED | OUTPATIENT
Start: 2025-08-01 | End: 2025-08-03 | Stop reason: HOSPADM

## 2025-08-01 RX ORDER — LORAZEPAM 2 MG/ML
1 INJECTION INTRAMUSCULAR ONCE
Status: COMPLETED | OUTPATIENT
Start: 2025-08-01 | End: 2025-08-01

## 2025-08-01 RX ORDER — SODIUM CHLORIDE 0.9 % (FLUSH) 0.9 %
5-40 SYRINGE (ML) INJECTION PRN
Status: DISCONTINUED | OUTPATIENT
Start: 2025-08-01 | End: 2025-08-03 | Stop reason: HOSPADM

## 2025-08-01 RX ORDER — SODIUM CHLORIDE 0.9 % (FLUSH) 0.9 %
5-40 SYRINGE (ML) INJECTION EVERY 12 HOURS SCHEDULED
Status: DISCONTINUED | OUTPATIENT
Start: 2025-08-01 | End: 2025-08-03 | Stop reason: HOSPADM

## 2025-08-01 RX ORDER — MULTIVIT-MIN/IRON/FOLIC ACID/K 18-600-40
2000 CAPSULE ORAL DAILY
COMMUNITY

## 2025-08-01 RX ORDER — ONDANSETRON 2 MG/ML
4 INJECTION INTRAMUSCULAR; INTRAVENOUS EVERY 6 HOURS PRN
Status: DISCONTINUED | OUTPATIENT
Start: 2025-08-01 | End: 2025-08-03

## 2025-08-01 RX ORDER — BUSPIRONE HYDROCHLORIDE 30 MG/1
30 TABLET ORAL 2 TIMES DAILY
COMMUNITY

## 2025-08-01 RX ORDER — 0.9 % SODIUM CHLORIDE 0.9 %
1000 INTRAVENOUS SOLUTION INTRAVENOUS ONCE
Status: COMPLETED | OUTPATIENT
Start: 2025-08-01 | End: 2025-08-01

## 2025-08-01 RX ORDER — ACETAMINOPHEN 325 MG/1
650 TABLET ORAL EVERY 6 HOURS PRN
Status: DISCONTINUED | OUTPATIENT
Start: 2025-08-01 | End: 2025-08-03 | Stop reason: HOSPADM

## 2025-08-01 RX ORDER — ONDANSETRON 2 MG/ML
4 INJECTION INTRAMUSCULAR; INTRAVENOUS ONCE
Status: COMPLETED | OUTPATIENT
Start: 2025-08-01 | End: 2025-08-01

## 2025-08-01 RX ORDER — MAGNESIUM SULFATE IN WATER 40 MG/ML
2000 INJECTION, SOLUTION INTRAVENOUS PRN
Status: DISCONTINUED | OUTPATIENT
Start: 2025-08-01 | End: 2025-08-03 | Stop reason: HOSPADM

## 2025-08-01 RX ORDER — DROPERIDOL 2.5 MG/ML
0.62 INJECTION, SOLUTION INTRAMUSCULAR; INTRAVENOUS ONCE
Status: COMPLETED | OUTPATIENT
Start: 2025-08-01 | End: 2025-08-01

## 2025-08-01 RX ORDER — DEXTROSE, SODIUM CHLORIDE, SODIUM LACTATE, POTASSIUM CHLORIDE, AND CALCIUM CHLORIDE 5; .6; .31; .03; .02 G/100ML; G/100ML; G/100ML; G/100ML; G/100ML
INJECTION, SOLUTION INTRAVENOUS CONTINUOUS
Status: DISCONTINUED | OUTPATIENT
Start: 2025-08-01 | End: 2025-08-03 | Stop reason: HOSPADM

## 2025-08-01 RX ORDER — ENOXAPARIN SODIUM 100 MG/ML
30 INJECTION SUBCUTANEOUS 2 TIMES DAILY
Status: DISCONTINUED | OUTPATIENT
Start: 2025-08-01 | End: 2025-08-03 | Stop reason: HOSPADM

## 2025-08-01 RX ADMIN — ENOXAPARIN SODIUM 30 MG: 100 INJECTION SUBCUTANEOUS at 21:32

## 2025-08-01 RX ADMIN — VENLAFAXINE HYDROCHLORIDE 75 MG: 75 TABLET ORAL at 21:31

## 2025-08-01 RX ADMIN — SODIUM CHLORIDE, PRESERVATIVE FREE 10 ML: 5 INJECTION INTRAVENOUS at 21:35

## 2025-08-01 RX ADMIN — ONDANSETRON 4 MG: 2 INJECTION, SOLUTION INTRAMUSCULAR; INTRAVENOUS at 14:35

## 2025-08-01 RX ADMIN — MECLIZINE HYDROCHLORIDE 25 MG: 25 TABLET ORAL at 07:53

## 2025-08-01 RX ADMIN — ONDANSETRON 4 MG: 2 INJECTION, SOLUTION INTRAMUSCULAR; INTRAVENOUS at 21:32

## 2025-08-01 RX ADMIN — SODIUM CHLORIDE 1000 ML: 0.9 INJECTION, SOLUTION INTRAVENOUS at 09:09

## 2025-08-01 RX ADMIN — DEXTROSE, SODIUM CHLORIDE, SODIUM LACTATE, POTASSIUM CHLORIDE, AND CALCIUM CHLORIDE: 5; .6; .31; .03; .02 INJECTION, SOLUTION INTRAVENOUS at 14:41

## 2025-08-01 RX ADMIN — BUSPIRONE HYDROCHLORIDE 30 MG: 7.5 TABLET ORAL at 21:31

## 2025-08-01 RX ADMIN — SODIUM CHLORIDE 1000 ML: 0.9 INJECTION, SOLUTION INTRAVENOUS at 06:35

## 2025-08-01 RX ADMIN — ONDANSETRON 4 MG: 2 INJECTION, SOLUTION INTRAMUSCULAR; INTRAVENOUS at 06:35

## 2025-08-01 RX ADMIN — DROPERIDOL 0.62 MG: 2.5 INJECTION, SOLUTION INTRAMUSCULAR; INTRAVENOUS at 08:33

## 2025-08-01 RX ADMIN — Medication 1 MG: at 06:35

## 2025-08-01 RX ADMIN — MECLIZINE HYDROCHLORIDE 25 MG: 25 TABLET ORAL at 17:46

## 2025-08-01 RX ADMIN — HYDROXYZINE PAMOATE 25 MG: 25 CAPSULE ORAL at 21:32

## 2025-08-01 ASSESSMENT — LIFESTYLE VARIABLES
HOW OFTEN DO YOU HAVE A DRINK CONTAINING ALCOHOL: NEVER
HOW MANY STANDARD DRINKS CONTAINING ALCOHOL DO YOU HAVE ON A TYPICAL DAY: PATIENT DOES NOT DRINK

## 2025-08-01 ASSESSMENT — PAIN SCALES - GENERAL
PAINLEVEL_OUTOF10: 3
PAINLEVEL_OUTOF10: 3

## 2025-08-01 ASSESSMENT — PAIN DESCRIPTION - PAIN TYPE: TYPE: ACUTE PAIN

## 2025-08-01 ASSESSMENT — PAIN - FUNCTIONAL ASSESSMENT: PAIN_FUNCTIONAL_ASSESSMENT: 0-10

## 2025-08-01 ASSESSMENT — PAIN DESCRIPTION - DESCRIPTORS: DESCRIPTORS: ACHING

## 2025-08-01 ASSESSMENT — PAIN DESCRIPTION - LOCATION: LOCATION: GENERALIZED

## 2025-08-02 LAB
ANION GAP SERPL CALCULATED.3IONS-SCNC: 9 MEQ/L (ref 9–15)
BUN SERPL-MCNC: 8 MG/DL (ref 6–20)
CALCIUM SERPL-MCNC: 8.6 MG/DL (ref 8.5–9.9)
CHLORIDE SERPL-SCNC: 102 MEQ/L (ref 95–107)
CO2 SERPL-SCNC: 24 MEQ/L (ref 20–31)
CREAT SERPL-MCNC: 0.61 MG/DL (ref 0.7–1.2)
GLUCOSE SERPL-MCNC: 101 MG/DL (ref 70–99)
POTASSIUM SERPL-SCNC: 3.7 MEQ/L (ref 3.4–4.9)
SODIUM SERPL-SCNC: 135 MEQ/L (ref 135–144)

## 2025-08-02 PROCEDURE — 2580000003 HC RX 258: Performed by: INTERNAL MEDICINE

## 2025-08-02 PROCEDURE — 6370000000 HC RX 637 (ALT 250 FOR IP): Performed by: INTERNAL MEDICINE

## 2025-08-02 PROCEDURE — 80048 BASIC METABOLIC PNL TOTAL CA: CPT

## 2025-08-02 PROCEDURE — 36415 COLL VENOUS BLD VENIPUNCTURE: CPT

## 2025-08-02 PROCEDURE — 6360000002 HC RX W HCPCS: Performed by: INTERNAL MEDICINE

## 2025-08-02 PROCEDURE — 1210000000 HC MED SURG R&B

## 2025-08-02 PROCEDURE — 97166 OT EVAL MOD COMPLEX 45 MIN: CPT

## 2025-08-02 PROCEDURE — 6370000000 HC RX 637 (ALT 250 FOR IP): Performed by: PSYCHIATRY & NEUROLOGY

## 2025-08-02 PROCEDURE — 99255 IP/OBS CONSLTJ NEW/EST HI 80: CPT | Performed by: PSYCHIATRY & NEUROLOGY

## 2025-08-02 PROCEDURE — 2500000003 HC RX 250 WO HCPCS: Performed by: INTERNAL MEDICINE

## 2025-08-02 PROCEDURE — 97162 PT EVAL MOD COMPLEX 30 MIN: CPT

## 2025-08-02 PROCEDURE — 6370000000 HC RX 637 (ALT 250 FOR IP)

## 2025-08-02 RX ORDER — MECLIZINE HYDROCHLORIDE 25 MG/1
25 TABLET ORAL 3 TIMES DAILY
Qty: 20 TABLET | Refills: 0 | Status: SHIPPED | OUTPATIENT
Start: 2025-08-02 | End: 2025-08-09

## 2025-08-02 RX ORDER — SCOPOLAMINE 1 MG/3D
1 PATCH, EXTENDED RELEASE TRANSDERMAL
Status: DISCONTINUED | OUTPATIENT
Start: 2025-08-02 | End: 2025-08-03 | Stop reason: HOSPADM

## 2025-08-02 RX ORDER — MECLIZINE HYDROCHLORIDE 25 MG/1
25 TABLET ORAL 3 TIMES DAILY
Status: DISCONTINUED | OUTPATIENT
Start: 2025-08-02 | End: 2025-08-03 | Stop reason: HOSPADM

## 2025-08-02 RX ORDER — SCOPOLAMINE 1 MG/3D
1 PATCH, EXTENDED RELEASE TRANSDERMAL
Qty: 2 PATCH | Refills: 0 | Status: SHIPPED | OUTPATIENT
Start: 2025-08-05 | End: 2025-08-11

## 2025-08-02 RX ADMIN — SODIUM CHLORIDE, PRESERVATIVE FREE 10 ML: 5 INJECTION INTRAVENOUS at 21:58

## 2025-08-02 RX ADMIN — ENOXAPARIN SODIUM 30 MG: 100 INJECTION SUBCUTANEOUS at 21:57

## 2025-08-02 RX ADMIN — BUSPIRONE HYDROCHLORIDE 30 MG: 7.5 TABLET ORAL at 11:08

## 2025-08-02 RX ADMIN — DEXTROSE, SODIUM CHLORIDE, SODIUM LACTATE, POTASSIUM CHLORIDE, AND CALCIUM CHLORIDE: 5; .6; .31; .03; .02 INJECTION, SOLUTION INTRAVENOUS at 17:30

## 2025-08-02 RX ADMIN — SODIUM CHLORIDE, PRESERVATIVE FREE 10 ML: 5 INJECTION INTRAVENOUS at 10:52

## 2025-08-02 RX ADMIN — ENOXAPARIN SODIUM 30 MG: 100 INJECTION SUBCUTANEOUS at 10:53

## 2025-08-02 RX ADMIN — ACETAMINOPHEN 650 MG: 325 TABLET ORAL at 17:00

## 2025-08-02 RX ADMIN — ACETAMINOPHEN 650 MG: 325 TABLET ORAL at 06:19

## 2025-08-02 RX ADMIN — ONDANSETRON 4 MG: 2 INJECTION, SOLUTION INTRAMUSCULAR; INTRAVENOUS at 22:11

## 2025-08-02 RX ADMIN — BUSPIRONE HYDROCHLORIDE 30 MG: 7.5 TABLET ORAL at 21:57

## 2025-08-02 RX ADMIN — PANTOPRAZOLE SODIUM 40 MG: 40 TABLET, DELAYED RELEASE ORAL at 06:18

## 2025-08-02 RX ADMIN — HYDROXYZINE PAMOATE 25 MG: 25 CAPSULE ORAL at 21:58

## 2025-08-02 RX ADMIN — MECLIZINE HYDROCHLORIDE 25 MG: 25 TABLET ORAL at 11:07

## 2025-08-02 RX ADMIN — VENLAFAXINE HYDROCHLORIDE 75 MG: 75 TABLET ORAL at 21:58

## 2025-08-02 RX ADMIN — MECLIZINE HYDROCHLORIDE 25 MG: 25 TABLET ORAL at 21:57

## 2025-08-02 ASSESSMENT — PAIN SCALES - GENERAL
PAINLEVEL_OUTOF10: 4
PAINLEVEL_OUTOF10: 3
PAINLEVEL_OUTOF10: 4

## 2025-08-02 ASSESSMENT — PAIN DESCRIPTION - DESCRIPTORS
DESCRIPTORS: ACHING
DESCRIPTORS: ACHING

## 2025-08-02 ASSESSMENT — PAIN DESCRIPTION - LOCATION
LOCATION: GENERALIZED
LOCATION: GENERALIZED
LOCATION: ABDOMEN;HEAD

## 2025-08-03 VITALS
TEMPERATURE: 97.9 F | BODY MASS INDEX: 34.35 KG/M2 | RESPIRATION RATE: 16 BRPM | OXYGEN SATURATION: 97 % | HEIGHT: 73 IN | WEIGHT: 259.2 LBS | SYSTOLIC BLOOD PRESSURE: 121 MMHG | HEART RATE: 66 BPM | DIASTOLIC BLOOD PRESSURE: 77 MMHG

## 2025-08-03 PROCEDURE — 99232 SBSQ HOSP IP/OBS MODERATE 35: CPT | Performed by: PSYCHIATRY & NEUROLOGY

## 2025-08-03 PROCEDURE — 6370000000 HC RX 637 (ALT 250 FOR IP)

## 2025-08-03 PROCEDURE — 6370000000 HC RX 637 (ALT 250 FOR IP): Performed by: PSYCHIATRY & NEUROLOGY

## 2025-08-03 PROCEDURE — 6360000002 HC RX W HCPCS: Performed by: INTERNAL MEDICINE

## 2025-08-03 PROCEDURE — 2580000003 HC RX 258: Performed by: INTERNAL MEDICINE

## 2025-08-03 RX ORDER — ONDANSETRON 4 MG/1
4 TABLET, ORALLY DISINTEGRATING ORAL EVERY 8 HOURS PRN
Qty: 30 TABLET | Refills: 0 | Status: SHIPPED | OUTPATIENT
Start: 2025-08-03

## 2025-08-03 RX ORDER — ONDANSETRON 4 MG/1
4 TABLET, FILM COATED ORAL EVERY 8 HOURS PRN
Status: DISCONTINUED | OUTPATIENT
Start: 2025-08-03 | End: 2025-08-03 | Stop reason: HOSPADM

## 2025-08-03 RX ADMIN — ONDANSETRON 4 MG: 2 INJECTION, SOLUTION INTRAMUSCULAR; INTRAVENOUS at 05:52

## 2025-08-03 RX ADMIN — DEXTROSE, SODIUM CHLORIDE, SODIUM LACTATE, POTASSIUM CHLORIDE, AND CALCIUM CHLORIDE: 5; .6; .31; .03; .02 INJECTION, SOLUTION INTRAVENOUS at 05:55

## 2025-08-03 RX ADMIN — MECLIZINE HYDROCHLORIDE 25 MG: 25 TABLET ORAL at 09:00

## 2025-08-03 RX ADMIN — ENOXAPARIN SODIUM 30 MG: 100 INJECTION SUBCUTANEOUS at 09:01

## 2025-08-03 RX ADMIN — PANTOPRAZOLE SODIUM 40 MG: 40 TABLET, DELAYED RELEASE ORAL at 05:52

## 2025-08-03 RX ADMIN — BUSPIRONE HYDROCHLORIDE 30 MG: 7.5 TABLET ORAL at 09:01

## 2025-08-04 ENCOUNTER — PATIENT OUTREACH (OUTPATIENT)
Dept: PRIMARY CARE | Facility: CLINIC | Age: 35
End: 2025-08-04
Payer: COMMERCIAL

## 2025-08-04 RX ORDER — SCOPOLAMINE 1 MG/3D
1 PATCH, EXTENDED RELEASE TRANSDERMAL
COMMUNITY
End: 2025-08-11

## 2025-08-04 RX ORDER — ONDANSETRON 4 MG/1
4 TABLET, FILM COATED ORAL EVERY 8 HOURS PRN
COMMUNITY

## 2025-08-04 RX ORDER — MECLIZINE HYDROCHLORIDE 25 MG/1
25 TABLET ORAL 3 TIMES DAILY
COMMUNITY
End: 2025-08-09

## 2025-08-04 NOTE — PROGRESS NOTES
Discharge Facility: [facility]  Discharge Diagnosis: Vertigo  Admission Date: 8/1/2025  Discharge Date: 8/3/2025    PCP Appointment Date:  -Pt declines scheduling PCP follow up appt at this time and will be following up with neurology; pt encouraged to call if needs change    Specialist Appointment Date:   -neurology Dr. Parsons through [facility]; to be scheduled    Hospital Encounter and Summary Linked: Yes    Hospital Encounter      See discharge assessment below for further details    Wrap Up  Wrap Up Additional Comments: Pt was admitted to [facility] 8/1-8/3/2025 for vertigo. Pt reports feeling a little better since discharge but states he is still having the dizziness and laying down helps. Discussed vestibular physical therapy; pt states this has not been ordered and he is going to discuss with neurology. Pt discharged with prescriptions for antivert, zofran, and scopalmine; pt denies questions or issues regarding medication. Pt reports understanding of discharge instructions. Pt declines scheduling PCP appt at this time and states he will be following up with neurology. Pt denies any questions, needs, or concerns. Pt encouraged to call if questions or needs arise. (8/4/2025 11:09 AM)  Call End Time: 1109 (8/4/2025 11:09 AM)    Engagement  Call Start Time: 1104 (8/4/2025 11:09 AM)    Medications  Medications reviewed with patient/caregiver?: Yes (8/4/2025 11:09 AM)  Is the patient having any side effects they believe may be caused by any medication additions or changes?: No (8/4/2025 11:09 AM)  Does the patient have all medications ordered at discharge?: Yes (8/4/2025 11:09 AM)  Care Management Interventions: No intervention needed (8/4/2025 11:09 AM)  Is the patient taking all medications as directed (includes completed medication regime)?: Yes (8/4/2025 11:09 AM)    Appointments  Does the patient have a primary care provider?: Yes (8/4/2025 11:09 AM)  Has the patient kept  scheduled appointments due by today?: Yes (8/4/2025 11:09 AM)  Care Management Interventions: Advised to schedule with specialist (8/4/2025 11:09 AM)    Self Management  Has home health visited the patient within 72 hours of discharge?: Not applicable (8/4/2025 11:09 AM)    Patient Teaching  Does the patient have access to their discharge instructions?: Yes (8/4/2025 11:09 AM)  Care Management Interventions: Reviewed instructions with patient (8/4/2025 11:09 AM)  What is the patient's perception of their health status since discharge?: Improving (8/4/2025 11:09 AM)  Is the patient/caregiver able to teach back the hierarchy of who to call/visit for symptoms/problems? PCP, Specialist, Home Health nurse, Urgent Care, ED, 911: Yes (8/4/2025 11:09 AM)

## 2025-08-06 ENCOUNTER — OFFICE VISIT (OUTPATIENT)
Dept: PRIMARY CARE | Facility: CLINIC | Age: 35
End: 2025-08-06
Payer: COMMERCIAL

## 2025-08-06 VITALS
TEMPERATURE: 97.4 F | DIASTOLIC BLOOD PRESSURE: 76 MMHG | WEIGHT: 261 LBS | HEART RATE: 76 BPM | SYSTOLIC BLOOD PRESSURE: 110 MMHG | RESPIRATION RATE: 16 BRPM | BODY MASS INDEX: 37.37 KG/M2 | HEIGHT: 70 IN

## 2025-08-06 DIAGNOSIS — H81.23 VESTIBULAR NEURONITIS OF BOTH EARS: Primary | ICD-10-CM

## 2025-08-06 PROBLEM — H81.20 VESTIBULAR NEURITIS: Status: ACTIVE | Noted: 2025-08-06

## 2025-08-06 PROCEDURE — 99214 OFFICE O/P EST MOD 30 MIN: CPT | Performed by: FAMILY MEDICINE

## 2025-08-06 PROCEDURE — 1036F TOBACCO NON-USER: CPT | Performed by: FAMILY MEDICINE

## 2025-08-06 PROCEDURE — 3008F BODY MASS INDEX DOCD: CPT | Performed by: FAMILY MEDICINE

## 2025-08-06 RX ORDER — ONDANSETRON 4 MG/1
4 TABLET, ORALLY DISINTEGRATING ORAL EVERY 8 HOURS PRN
COMMUNITY
Start: 2025-08-03

## 2025-08-06 ASSESSMENT — PATIENT HEALTH QUESTIONNAIRE - PHQ9
SUM OF ALL RESPONSES TO PHQ9 QUESTIONS 1 AND 2: 0
2. FEELING DOWN, DEPRESSED OR HOPELESS: NOT AT ALL
1. LITTLE INTEREST OR PLEASURE IN DOING THINGS: NOT AT ALL

## 2025-08-06 NOTE — ASSESSMENT & PLAN NOTE
This 75-year-old male has a history of recurrent vestibular neuronitis.  He does not have acute hearing loss or tinnitus making Ménière's syndrome unlikely.  He was recently hospitalized for 2 days with a full neurologic workup by Dr. Parsons and a CT scan of the head which was all normal.  He did respond to the combination of scopolamine and meclizine to mitigate his symptoms but they are still present and he still has significant vertigo on head turning movements.  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  He will taper off the symptom control medication as his symptoms improved.  He is scheduled to have an MRI of the brain to ensure that there is no other underlying etiology that has been missed on previous workup.

## 2025-08-06 NOTE — PROGRESS NOTES
Subjective   Darwin Ferrell is a 35 y.o. male who presents for Hospital Follow-up.     HPI         Discharge Facility: [facility]  Discharge Diagnosis: Vertigo  Admission Date: 8/1/2025  Discharge Date: 8/3/2025  A review of the hospital record indicates that he was admitted for 2 days to the hospital starting August 1 and treated for acute vestibular neuronitis.  CT scan of the head was normal and blood work was essentially normal.  I[place]upak the etiology was listed.  He was fully evaluated by neurology who recommended scopolamine patch, meclizine, and rest and stay off from work.  He works as a pharmacist.  His symptoms are significantly improved but he is still getting significant vertigo when turning his head left to the right.  He feels that he is unable to perform his duties as a pharmacist with this current symptoms.  Review of Systems   Visit Vitals  /76   Pulse 76   Temp 36.3 °C (97.4 °F)   Resp 16      Objective   Physical Exam  Constitutional:       Appearance: Normal appearance.   HENT:      Head: Normocephalic.   Pulmonary:      Effort: Pulmonary effort is normal.     Musculoskeletal:      Cervical back: Neck supple.     Skin:     General: Skin is warm and dry.     Psychiatric:         Mood and Affect: Mood normal.       No data recorded     No data recorded   Patient Health Questionnaire-2 Score: 0 (8/6/2025 12:55 PM)   Assessment & Plan  Vestibular neuronitis of both ears  This 75-year-old male has a history of recurrent vestibular neuronitis.  He does not have acute hearing loss or tinnitus making Ménière's syndrome unlikely.  He was recently hospitalized for 2 days with a full neurologic workup by Dr. Parsons and a CT scan of the head which was all normal.  He did respond to the combination of scopolamine and meclizine to mitigate his symptoms but they are still present and he still has significant vertigo on head turning movements.  He is currently unable to perform his duties  as a pharmacist so I recommended we continue his leave of absence through August 11.  He will taper off the symptom control medication as his symptoms improved.  He is scheduled to have an MRI of the brain to ensure that there is no other underlying etiology that has been missed on previous workup.       [facility] paperwork was completed for a temporary leave of absence August 1 through August 11      Please excuse any errors in grammar or translation related to this dictation. Voice recognition software was utilized to prepare this document.

## 2025-08-13 ENCOUNTER — OFFICE VISIT (OUTPATIENT)
Age: 35
End: 2025-08-13
Payer: COMMERCIAL

## 2025-08-13 VITALS
HEART RATE: 89 BPM | SYSTOLIC BLOOD PRESSURE: 131 MMHG | DIASTOLIC BLOOD PRESSURE: 100 MMHG | WEIGHT: 264 LBS | BODY MASS INDEX: 34.83 KG/M2

## 2025-08-13 DIAGNOSIS — R42 DIZZINESS: ICD-10-CM

## 2025-08-13 DIAGNOSIS — H81.13 BENIGN PAROXYSMAL POSITIONAL VERTIGO DUE TO BILATERAL VESTIBULAR DISORDER: Primary | ICD-10-CM

## 2025-08-13 DIAGNOSIS — I80.9 THROMBOPHLEBITIS: ICD-10-CM

## 2025-08-13 DIAGNOSIS — H55.00 NYSTAGMUS: ICD-10-CM

## 2025-08-13 PROCEDURE — 99214 OFFICE O/P EST MOD 30 MIN: CPT | Performed by: PSYCHIATRY & NEUROLOGY

## 2025-08-13 RX ORDER — TIRZEPATIDE 2.5 MG/.5ML
2.5 INJECTION, SOLUTION SUBCUTANEOUS
COMMUNITY
Start: 2025-06-26 | End: 2025-08-13

## 2025-08-13 RX ORDER — ALBUTEROL SULFATE 90 UG/1
2 INHALANT RESPIRATORY (INHALATION) EVERY 6 HOURS PRN
COMMUNITY
Start: 2025-05-15

## 2025-08-13 RX ORDER — FLUTICASONE PROPIONATE 50 MCG
2 SPRAY, SUSPENSION (ML) NASAL DAILY
Qty: 16 G | Refills: 0 | Status: SHIPPED | OUTPATIENT
Start: 2025-08-13

## 2025-08-13 RX ORDER — SCOPOLAMINE 1 MG/3D
1 PATCH, EXTENDED RELEASE TRANSDERMAL
COMMUNITY
Start: 2025-08-11

## 2025-08-13 RX ORDER — SEMAGLUTIDE 0.25 MG/.5ML
0.25 INJECTION, SOLUTION SUBCUTANEOUS WEEKLY
COMMUNITY
Start: 2025-06-25 | End: 2025-08-13

## 2025-08-13 RX ORDER — MECLIZINE HYDROCHLORIDE 25 MG/1
TABLET ORAL
COMMUNITY
Start: 2025-08-02

## 2025-08-13 RX ORDER — CYCLOBENZAPRINE HCL 5 MG
5 TABLET ORAL 3 TIMES DAILY PRN
COMMUNITY
Start: 2025-03-31

## 2025-08-18 ENCOUNTER — PATIENT OUTREACH (OUTPATIENT)
Dept: PRIMARY CARE | Facility: CLINIC | Age: 35
End: 2025-08-18
Payer: COMMERCIAL

## 2025-08-18 DIAGNOSIS — H81.23 VESTIBULAR NEURONITIS OF BOTH EARS: ICD-10-CM

## 2025-08-18 RX ORDER — SCOPOLAMINE 1 MG/3D
1 PATCH, EXTENDED RELEASE TRANSDERMAL
Qty: 4 PATCH | Refills: 0 | Status: SHIPPED | OUTPATIENT
Start: 2025-08-18

## 2025-08-20 ENCOUNTER — TELEPHONE (OUTPATIENT)
Age: 35
End: 2025-08-20

## 2025-09-01 ENCOUNTER — PATIENT MESSAGE (OUTPATIENT)
Age: 35
End: 2025-09-01